# Patient Record
Sex: FEMALE | Race: WHITE | NOT HISPANIC OR LATINO | URBAN - METROPOLITAN AREA
[De-identification: names, ages, dates, MRNs, and addresses within clinical notes are randomized per-mention and may not be internally consistent; named-entity substitution may affect disease eponyms.]

---

## 2017-03-02 ENCOUNTER — OFFICE VISIT (OUTPATIENT)
Dept: INTERNAL MEDICINE | Facility: CLINIC | Age: 27
End: 2017-03-02

## 2017-03-02 VITALS
SYSTOLIC BLOOD PRESSURE: 124 MMHG | HEART RATE: 76 BPM | DIASTOLIC BLOOD PRESSURE: 82 MMHG | BODY MASS INDEX: 19.92 KG/M2 | WEIGHT: 129.1 LBS

## 2017-03-02 DIAGNOSIS — B07.0 PLANTAR WART: Primary | ICD-10-CM

## 2017-03-02 DIAGNOSIS — B07.8 COMMON WART: ICD-10-CM

## 2017-03-02 ASSESSMENT — PAIN SCALES - GENERAL: PAINLEVEL: SEVERE PAIN (6)

## 2017-03-02 NOTE — PROGRESS NOTES
HPI:  Opal Toth is a 26 year old female who presents to clinic today for LN2 treatment of a wart on her right first toe plantar aspect, and her left lateral thumb.  She has recurrent warts which she usually treats with OTC treatments but these did not respond to Duct tape, or Compound W.  The toe wart has been present for 4 months and is getting bigger.  The thumb wart was noted yesterday    She has the following medical issues :   Patient Active Problem List   Diagnosis     Deviated nasal septum     Tonsillar hypertrophy       Patient Active Problem List   Diagnosis     Deviated nasal septum     Tonsillar hypertrophy       MEDICATIONS:  Current Outpatient Prescriptions   Medication Sig Dispense Refill     Calcium Carbonate-Vit D-Min (CALCIUM 1200 PO)        VITAMIN D, CHOLECALCIFEROL, PO Take 2,000 Units by mouth daily       levonorgestrel-ethinyl estradiol (ENPRESSE) per tablet Take 1 tablet by mouth daily 84 tablet 3     [DISCONTINUED] Levonorg-Eth Estrad Triphasic (ENPRESSE-28 PO)          ALLERGIES: Review of patient's allergies indicates no known allergies.    PAST MEDICAL HX: No past medical history on file.    PAST SURGICAL HX: No past surgical history on file.    FAMILY MEDICAL HX: No family history on file.    IMMUNIZATION HX:   Immunization History   Administered Date(s) Administered     DTAP (<7y) 1992, 1992, 1992, 07/19/1993, 02/19/1997     HIB 1992, 1992, 1992, 07/19/1993     Hepatitis A Vac Ped/Adol-2 Dose 08/21/2009, 02/22/2010     Human Papilloma Virus 08/21/2009, 10/21/2009, 02/22/2010     Influenza Vaccine IM 3yrs+ 4 Valent IIV4 09/09/2015     MMR 07/19/1993, 12/04/2003     Meningococcal (Menomune ) 08/21/2009     OPV 1992, 1992, 07/19/1993, 02/19/1997     TD (ADULT, 7+) 12/04/2003     Tdap (Adacel,Boostrix) 06/18/2014       SOCIAL HX:   Social History     Social History     Marital status: Single     Spouse name: N/A     Number of children: N/A      Years of education: N/A     Social History Main Topics     Smoking status: Never Smoker     Smokeless tobacco: Never Used     Alcohol use None     Drug use: None     Sexual activity: Not Asked     Other Topics Concern     None     Social History Narrative    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  She is single but in a monogamous relationship.        OBJECTIVE:  /82 (BP Location: Right arm, Patient Position: Chair, Cuff Size: Adult Regular)  Pulse 76  Wt 58.6 kg (129 lb 1.6 oz)  BMI 19.92 kg/m2   Wt Readings from Last 1 Encounters:   03/02/17 58.6 kg (129 lb 1.6 oz)     Constitutional: no distress, comfortable, pleasant   Right plantar toe wart measures 4 mm x 4 mm in the crease of the MTP joint.   The left hand wart measures 2 x 2 mm.  Both were treated with LN2 (the toe x 4 for 3-5 seconds) ( finger x 3 applications).  She tolerated the procedure fine.      ASSESSMENT/PLAN:    ICD-10-CM    1. Plantar wart B07.0 DESTRUCT BENIGN LESION, UP TO 14   2. Common wart B07.8 DESTRUCT BENIGN LESION, UP TO 14         Total time spent  20.  More than 50% of the time spent with Ms. Toth on counseling / coordinating her care    Nely WILKERSON CNP

## 2017-03-02 NOTE — PATIENT INSTRUCTIONS
Primary Care Center Medication Refill Request Information:  * Please contact your pharmacy regarding ANY request for medication refills.  ** [facility] Prescription Fax = [phone]  * Please allow 3 business days for routine medication refills.  * Please allow 5 business days for controlled substance medication refills.     Primary Care Center Test Result notification information:  *You will be notified with in 7-10 days of your appointment day regarding the results of your test.  If you are on MyChart you will be notified as soon as the provider has reviewed the results and signed off on them.

## 2017-03-02 NOTE — MR AVS SNAPSHOT
After Visit Summary   3/2/2017    Opal Toth    MRN: [id number]           Patient Information     Date Of Birth          1990        Visit Information        Provider Department      3/2/2017 5:00 PM Nely Lloyd, [facility] On license of [facility]        Today's Diagnoses     Plantar wart    -  1    Common wart          Care Instructions    Primary Care Center Medication Refill Request Information:  * Please contact your pharmacy regarding ANY request for medication refills.  ** [facility] Prescription Fax = [phone]  * Please allow 3 business days for routine medication refills.  * Please allow 5 business days for controlled substance medication refills.     Primary Care Center Test Result notification information:  *You will be notified with in 7-10 days of your appointment day regarding the results of your test.  If you are on MyChart you will be notified as soon as the provider has reviewed the results and signed off on them.          Follow-ups after your visit        Your next 10 appointments already scheduled     Mar 20, 2017  3:30 PM CDT   (Arrive by 3:15 PM)   New Patient Visit with SANGEETA Joaquin [facility] ([facility])    [address] [place] [address] [phone]              Who to contact     Please call your clinic at [phone] to:    Ask questions about your health    Make or cancel appointments    Discuss your medicines    Learn about your test results    Speak to your doctor   If you have compliments or concerns about an experience at your clinic, or if you wish to file a complaint, please contact [facility] Patient Relations at [phone] or email us at [email or web address]         Additional Information About Your Visit        MyChart Information     [employer] gives you secure access to your electronic health record. If you see a primary care  provider, you can also send messages to your care team and make appointments. If you have questions, please call your primary care clinic.  If you do not have a primary care provider, please call [phone] and they will assist you.      [employer] Office is an electronic gateway that provides easy, online access to your medical records. With [employer], you can request a clinic appointment, read your test results, renew a prescription or communicate with your care team.     To access your existing account, please contact your [facility] or call [phone] for assistance.        Care EveryWhere ID     This is your Care EveryWhere ID. This could be used by other organizations to access your [facility] medical records  [id number]        Your Vitals Were     Pulse BMI (Body Mass Index)                76 19.92 kg/m2           Blood Pressure from Last 3 Encounters:   03/02/17 124/82   06/28/16 115/74    Weight from Last 3 Encounters:   03/02/17 58.6 kg (129 lb 1.6 oz)   10/24/16 58.1 kg (128 lb)   06/28/16 58.8 kg (129 lb 11.2 oz)              We Performed the Following     DESTRUCT BENIGN LESION, UP TO 14          Today's Medication Changes          These changes are accurate as of: 3/2/17  8:12 PM.  If you have any questions, ask your nurse or doctor.               These medicines have changed or have updated prescriptions.        Dose/Directions    levonorgestrel-ethinyl estradiol per tablet   Commonly known as:  ENPRESSE   This may have changed:  Another medication with the same name was removed. Continue taking this medication, and follow the directions you see here.   Used for:  Encounter for initial prescription of contraceptive pills   Changed by:  Nely Lloyd APRN CNP        Dose:  1 tablet   Take 1 tablet by mouth daily   Quantity:  84 tablet   Refills:  3                Primary Care Provider Office Phone # Fax #    [facility]OCK Cazares [phone]        PRIMARY CARE CENTER [address] [place] [address] 743  [facility] [address]        Thank you!     Thank you for choosing [facility]  for your care. Our goal is always to provide you with excellent care. Hearing back from our patients is one way we can continue to improve our services. Please take a few minutes to complete the written survey that you may receive in the mail after your visit with us. Thank you!             Your Updated Medication List - Protect others around you: Learn how to safely use, store and throw away your medicines at www.disposemymeds.org.          This list is accurate as of: 3/2/17  8:12 PM.  Always use your most recent med list.                   Brand Name Dispense Instructions for use    CALCIUM 1200 PO          levonorgestrel-ethinyl estradiol per tablet    ENPRESSE    84 tablet    Take 1 tablet by mouth daily       VITAMIN D (CHOLECALCIFEROL) PO      Take 2,000 Units by mouth daily

## 2017-03-02 NOTE — NURSING NOTE
Chief Complaint   Patient presents with     Wart     patient states she is here for a wart removal     EMIL CURIEL at 5:04 PM on 3/2/2017.

## 2017-03-20 ENCOUNTER — OFFICE VISIT (OUTPATIENT)
Dept: DERMATOLOGY | Facility: CLINIC | Age: 27
End: 2017-03-20

## 2017-03-20 DIAGNOSIS — Z12.83 SKIN CANCER SCREENING: Primary | ICD-10-CM

## 2017-03-20 DIAGNOSIS — D22.9 MULTIPLE PIGMENTED NEVI: ICD-10-CM

## 2017-03-20 DIAGNOSIS — L81.4 SOLAR LENTIGINOSIS: ICD-10-CM

## 2017-03-20 DIAGNOSIS — D48.9 NEOPLASM OF UNCERTAIN BEHAVIOR: ICD-10-CM

## 2017-03-20 RX ORDER — LIDOCAINE HYDROCHLORIDE AND EPINEPHRINE 10; 10 MG/ML; UG/ML
3 INJECTION, SOLUTION INFILTRATION; PERINEURAL ONCE
Qty: 3 ML | Refills: 0 | OUTPATIENT
Start: 2017-03-20 | End: 2017-03-20

## 2017-03-20 ASSESSMENT — PAIN SCALES - GENERAL: PAINLEVEL: NO PAIN (0)

## 2017-03-20 NOTE — MR AVS SNAPSHOT
After Visit Summary   3/20/2017    Opal Toth    MRN: [id number]           Patient Information     Date Of Birth          1990        Visit Information        Provider Department      3/20/2017 3:30 PM Shalini Navarro PA-C [facility]        Today's Diagnoses     Skin cancer screening    -  1    Solar lentiginosis        Multiple pigmented nevi        Neoplasm of uncertain behavior          Care Instructions    Wound Care After a Biopsy    What is a skin biopsy?  A skin biopsy allows the doctor to examine a very small piece of tissue under the microscope to determine the diagnosis and the best treatment for the skin condition. A local anesthetic (numbing medicine)  is injected with a very small needle into the skin area to be tested. A small piece of skin is taken from the area. Sometimes a suture (stitch) is used.     What are the risks of a skin biopsy?  I will experience scar, bleeding, swelling, pain, crusting and redness. I may experience incomplete removal or recurrence. Risks of this procedure are excessive bleeding, bruising, infection, nerve damage, numbness, thick (hypertrophic or keloidal) scar and non-diagnostic biopsy.    How should I care for my wound for the first 24 hours?    Keep the wound dry and covered for 24 hours    If it bleeds, hold direct pressure on the area for 15 minutes. If bleeding does not stop then go to the emergency room    Avoid strenuous exercise the first 1-2 days or as your doctor instructs you    How should I care for the wound after 24 hours?    After 24 hours, remove the bandage    You may bathe or shower as normal    If you had a scalp biopsy, you can shampoo as usual and can use shower water to clean the biopsy site daily    Clean the wound twice a day with gentle soap and water    Do not scrub, be gentle    Apply white petroleum/Vaseline after cleaning the wound with a cotton swab or a clean finger, and keep the site covered with a  Bandaid /bandage. Bandages are not necessary with a scalp biopsy    If you are unable to cover the site with a Bandaid /bandage, re-apply ointment 2-3 times a day to keep the site moist. Moisture will help with healing    Avoid strenuous activity for first 1-2 days    Avoid lakes, rivers, pools, and oceans until the stitches are removed or the site is healed    How do I clean my wound?    Wash hands for 15 minutes with soap or use hand  before all wound care    Clean the wound with gentle soap and water    Apply white petroleum/Vaseline  to wound after it is clean    Replace the Bandaid /bandage to keep the wound covered for the first few days or as instructed by your doctor    If you had a scalp biopsy, warm shower water to the area on a daily basis should suffice    What should I use to clean my wound?     Cotton-tipped applicators (Qtips )    White petroleum jelly (Vaseline ). Use a clean new container and use Q-tips to apply.    Bandaids   as needed    Gentle soap     How should I care for my wound long term?    Do not get your wound dirty    Keep up with wound care for one week or until the area is healed.    A small scab will form and fall off by itself when the area is completely healed. The area will be red and will become pink in color as it heals. Sun protection is very important for how your scar will turn out. Sunscreen with an SPF 30 or greater is recommended once the area is healed.    If you have stitches, stitches need to be removed in  days. You may return to our clinic for this or you may have it done locally at your doctor s office.    You should have some soreness but it should be mild and slowly go away over several days. Talk to your doctor about using tylenol for pain,    When should I call my doctor?  If you have increased:     Pain or swelling    Pus or drainage (clear or slightly yellow drainage is ok)    Temperature over 100F    Spreading redness or warmth around wound    When  will I hear about my results?  The biopsy results can take 2-3 weeks to come back. The clinic will call you with the results, send you a [employer] message, or have you schedule a follow-up clinic or phone time to discuss the results. Contact our clinics if you do not hear from us in 3 weeks.     Who should I call with questions?    [facility]: [phone]     [facility]: [phone]    For urgent needs outside of business hours call the [facility] at [phone] and ask for the dermatology resident on call            Follow-ups after your visit        Who to contact     Please call your clinic at [phone] to:    Ask questions about your health    Make or cancel appointments    Discuss your medicines    Learn about your test results    Speak to your doctor   If you have compliments or concerns about an experience at your clinic, or if you wish to file a complaint, please contact [facility] Patient Relations at [phone] or email us at [email or web address]         Additional Information About Your Visit        ePub Directhart Information     [employer] gives you secure access to your electronic health record. If you see a primary care provider, you can also send messages to your care team and make appointments. If you have questions, please call your primary care clinic.  If you do not have a primary care provider, please call [phone] and they will assist you.      [employer] is an electronic gateway that provides easy, online access to your medical records. With [employer], you can request a clinic appointment, read your test results, renew a prescription or communicate with your care team.     To access your existing account, please contact your [facility] or call [phone] for assistance.        Care EveryWhere ID     This is your Care EveryWhere ID. This could be used by other  organizations to access your [facility] medical records  [id number]         Blood Pressure from Last 3 Encounters:   03/02/17 124/82   06/28/16 115/74    Weight from Last 3 Encounters:   03/02/17 58.6 kg (129 lb 1.6 oz)   10/24/16 58.1 kg (128 lb)   06/28/16 58.8 kg (129 lb 11.2 oz)              We Performed the Following     BIOPSY SKIN/SUBQ/MUC MEM, EACH ADDTL LESION     BIOPSY SKIN/SUBQ/MUC MEM, SINGLE LESION     Surgical pathology exam          Today's Medication Changes          These changes are accurate as of: 3/20/17  4:16 PM.  If you have any questions, ask your nurse or doctor.               Start taking these medicines.        Dose/Directions    lidocaine 1% with EPINEPHrine 1:100,000 1 %-1:173021 injection   Used for:  Neoplasm of uncertain behavior   Started by:  Shalini Navarro PA-C        Dose:  3 mL   Inject 3 mLs into the skin once for 1 dose   Quantity:  3 mL   Refills:  0            Where to get your medicines      Some of these will need a paper prescription and others can be bought over the counter.  Ask your nurse if you have questions.     You don't need a prescription for these medications     lidocaine 1% with EPINEPHrine 1:100,000 1 %-1:180017 injection                Primary Care Provider Office Phone # Fax #    Nely ROCK Small [phone]       PRIMARY CARE CENTER [address] [place] [address] 741  [facility] [address]        Thank you!     Thank you for choosing [facility]  for your care. Our goal is always to provide you with excellent care. Hearing back from our patients is one way we can continue to improve our services. Please take a few minutes to complete the written survey that you may receive in the mail after your visit with us. Thank you!             Your Updated Medication List - Protect others around you: Learn how to safely use, store and throw away your medicines at www.disposemymeds.org.          This list is accurate as of: 3/20/17  4:16  PM.  Always use your most recent med list.                   Brand Name Dispense Instructions for use    CALCIUM 1200 PO          levonorgestrel-ethinyl estradiol per tablet    ENPRESSE    84 tablet    Take 1 tablet by mouth daily       lidocaine 1% with EPINEPHrine 1:100,000 1 %-1:351466 injection     3 mL    Inject 3 mLs into the skin once for 1 dose       VITAMIN D (CHOLECALCIFEROL) PO      Take 2,000 Units by mouth daily

## 2017-03-20 NOTE — NURSING NOTE
Dermatology Rooming Note    Opal Toth's goals for this visit include:   Chief Complaint   Patient presents with     Skin Check     Skin check, Opal notes several areas of concern.     Jahaira Lord LPN

## 2017-03-20 NOTE — LETTER
3/20/2017       RE: Opal Toth  [address]  [place] [address]     Dear Colleague,    Thank you for referring your patient, Opal Toth, to the [facility] DERMATOLOGY at [facility]. Please see a copy of my visit note below.    [facility] Dermatology Note      Dermatology Problem List:  1. Skin cancer screening 3/20 with multiple benign nevi, solar lentigines.  Hx of tanning bed use and outdoor tanning.  3/20 Shave Bx to her Left anterior hip, Left central back medial, Left central back lateral, Right central back       CC:   Chief Complaint   Patient presents with     Skin Check     Skin check, Opal notes several areas of concern.         Encounter Date: Mar 20, 2017    History of Present Illness:  Ms. Opal Toth is a 26 year old female who presents as a referral from MIGUEL A Lloyd. She is here for a skin cancer screening. She has had a lot of sun exposure over her lifetime and some sunburns. She noticed a mole on her left hip has gotten larger. She also wonders about moles on her back.     She has a history of a mole removal on her left thigh when she was approximately 12 yrs old. She states this was benign.     She is feeling well otherwise, without other skin concerns.She denies any spots that are painful, bleeding or itchy.        Past Medical History:   Patient Active Problem List   Diagnosis     Deviated nasal septum     Tonsillar hypertrophy     No past medical history on file.  No past surgical history on file.    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 The patient admits to use of tanning beds, prior to events, last time 3 yrs ago.     Family History:  There is no family history of skin cancer.    Medications:  Current Outpatient Prescriptions   Medication Sig Dispense Refill     Calcium Carbonate-Vit D-Min (CALCIUM 1200 PO)        VITAMIN D, CHOLECALCIFEROL, PO Take 2,000 Units by mouth daily       levonorgestrel-ethinyl  estradiol (ENPRESSE) per tablet Take 1 tablet by mouth daily 84 tablet 3     No Known Allergies      Review of Systems:  -Skin/Heme New Pt: The patient admits to frequent sun exposure. Outdoors a lot. Sunburns in youth.  The patient denies excessive scarring or problems healing except as per HPI. The patient denies excessive bleeding.  -Constitutional: The patient denies fatigue, fevers, chills, unintended weight loss, and night sweats.  -HEENT: Patient denies nonhealing oral sores.  -Skin: As above in HPI. No additional skin concerns.    Physical exam:  Vitals: There were no vitals taken for this visit.  GEN: This is a well developed, well-nourished female in no acute distress, in a pleasant mood.    SKIN: Full skin, which includes the head/face, both arms, chest, back, abdomen,both legs, genitalia and/or groin buttocks, digits and/or nails, was examined.  On her left anterior hip there is a 5 mm brown variegated macule.   On the left central back medial there is a 4 mm brown variegated macule with irregular borders.   On the left central back lateral there is a 4 mm brown variegated macule with irregular borders.   On the right central back there is a 4 mm brown variegated macule with irregular borders.   -Multiple regular brown pigmented macules and papules are identified on the face, trunk and extremities, including a 4 cm x 4 cm uniform macule on the right plantar foot .   -Scattered brown macules on sun exposed areas.  -No other lesions of concern on areas examined.     Impression/Plan:  1. 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 The differential diagnosis includes dysplastic nevi vs benign nevi vs other     Shave biopsy:  After discussion of benefits and risks including but not limited to bleeding/bruising, pain/swelling, infection, scar, incomplete removal, nerve damage/numbness, recurrence, and non-diagnostic  biopsy, written consent, verbal consent and photographs were obtained. Time-out was performed. The area was cleaned with isopropyl alcohol. 3 mL of 1% lidocaine with epinephrine was injected to obtain adequate anesthesia of the lesion on the  left anterior hip, left central back medial and left central back lateral and right central back. A shave biopsy was performed. Hemostasis was achieved with aluminium chloride. Vaseline and a sterile dressing were applied. The patient tolerated the procedure and no complications were noted. The patient was provided with verbal and written post care instructions.       2. Multiple clinically benign nevi on the face, trunk and extremities    ABCDs of melanoma were discussed and self skin checks were advised.     Recommend recheck in 6 months as there were many concerning nevi today.     3. Solar lentigines    Sun precaution was advised including the use of sun screens of SPF 30 or higher, sun protective clothing, and avoidance of tanning beds.    Staff Involved:  Staff Only    All risks, benefits and alternatives were discussed with patient.  Patient is in agreement and understands the assessment and plan.  All questions were answered.  Sun Screen Education was given.   Return to Clinic in 6 months or sooner as needed.   Shalini Navarro PA-C

## 2017-03-20 NOTE — PROGRESS NOTES
[facility] Dermatology Note      Dermatology Problem List:  1. Skin cancer screening 3/20 with multiple benign nevi, solar lentigines.  Hx of tanning bed use and outdoor tanning.  3/20 Shave Bx to her Left anterior hip, Left central back medial, Left central back lateral, Right central back       CC:   Chief Complaint   Patient presents with     Skin Check     Skin check, Opal notes several areas of concern.         Encounter Date: Mar 20, 2017    History of Present Illness:  Ms. Opal Toth is a 26 year old female who presents as a referral from MIGUEL A Lloyd. She is here for a skin cancer screening. She has had a lot of sun exposure over her lifetime and some sunburns. She noticed a mole on her left hip has gotten larger. She also wonders about moles on her back.     She has a history of a mole removal on her left thigh when she was approximately 12 yrs old. She states this was benign.     She is feeling well otherwise, without other skin concerns.She denies any spots that are painful, bleeding or itchy.        Past Medical History:   Patient Active Problem List   Diagnosis     Deviated nasal septum     Tonsillar hypertrophy     No past medical history on file.  No past surgical history on file.    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 The patient admits to use of tanning beds, prior to events, last time 3 yrs ago.     Family History:  There is no family history of skin cancer.    Medications:  Current Outpatient Prescriptions   Medication Sig Dispense Refill     Calcium Carbonate-Vit D-Min (CALCIUM 1200 PO)        VITAMIN D, CHOLECALCIFEROL, PO Take 2,000 Units by mouth daily       levonorgestrel-ethinyl estradiol (ENPRESSE) per tablet Take 1 tablet by mouth daily 84 tablet 3     No Known Allergies      Review of Systems:  -Skin/Heme New Pt: The patient admits to frequent sun exposure. Outdoors a lot. Sunburns in youth.  The patient denies excessive scarring or problems  healing except as per HPI. The patient denies excessive bleeding.  -Constitutional: The patient denies fatigue, fevers, chills, unintended weight loss, and night sweats.  -HEENT: Patient denies nonhealing oral sores.  -Skin: As above in HPI. No additional skin concerns.    Physical exam:  Vitals: There were no vitals taken for this visit.  GEN: This is a well developed, well-nourished female in no acute distress, in a pleasant mood.    SKIN: Full skin, which includes the head/face, both arms, chest, back, abdomen,both legs, genitalia and/or groin buttocks, digits and/or nails, was examined.  On her left anterior hip there is a 5 mm brown variegated macule.   On the left central back medial there is a 4 mm brown variegated macule with irregular borders.   On the left central back lateral there is a 4 mm brown variegated macule with irregular borders.   On the right central back there is a 4 mm brown variegated macule with irregular borders.   -Multiple regular brown pigmented macules and papules are identified on the face, trunk and extremities, including a 4 cm x 4 cm uniform macule on the right plantar foot .   -Scattered brown macules on sun exposed areas.  -No other lesions of concern on areas examined.     Impression/Plan:  1. 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 The differential diagnosis includes dysplastic nevi vs benign nevi vs other     Shave biopsy:  After discussion of benefits and risks including but not limited to bleeding/bruising, pain/swelling, infection, scar, incomplete removal, nerve damage/numbness, recurrence, and non-diagnostic biopsy, written consent, verbal consent and photographs were obtained. Time-out was performed. The area was cleaned with isopropyl alcohol. 3 mL of 1% lidocaine with epinephrine was injected to obtain adequate anesthesia of the lesion on the  left anterior hip, left central  back medial and left central back lateral and right central back. A shave biopsy was performed. Hemostasis was achieved with aluminium chloride. Vaseline and a sterile dressing were applied. The patient tolerated the procedure and no complications were noted. The patient was provided with verbal and written post care instructions.       2. Multiple clinically benign nevi on the face, trunk and extremities    ABCDs of melanoma were discussed and self skin checks were advised.     Recommend recheck in 6 months as there were many concerning nevi today.     3. Solar lentigines    Sun precaution was advised including the use of sun screens of SPF 30 or higher, sun protective clothing, and avoidance of tanning beds.          Staff Involved:  Staff Only    All risks, benefits and alternatives were discussed with patient.  Patient is in agreement and understands the assessment and plan.  All questions were answered.  Sun Screen Education was given.   Return to Clinic in 6 months or sooner as needed.   Shalini Navarro PA-C

## 2017-03-20 NOTE — PATIENT INSTRUCTIONS
Wound Care After a Biopsy    What is a skin biopsy?  A skin biopsy allows the doctor to examine a very small piece of tissue under the microscope to determine the diagnosis and the best treatment for the skin condition. A local anesthetic (numbing medicine)  is injected with a very small needle into the skin area to be tested. A small piece of skin is taken from the area. Sometimes a suture (stitch) is used.     What are the risks of a skin biopsy?  I will experience scar, bleeding, swelling, pain, crusting and redness. I may experience incomplete removal or recurrence. Risks of this procedure are excessive bleeding, bruising, infection, nerve damage, numbness, thick (hypertrophic or keloidal) scar and non-diagnostic biopsy.    How should I care for my wound for the first 24 hours?    Keep the wound dry and covered for 24 hours    If it bleeds, hold direct pressure on the area for 15 minutes. If bleeding does not stop then go to the emergency room    Avoid strenuous exercise the first 1-2 days or as your doctor instructs you    How should I care for the wound after 24 hours?    After 24 hours, remove the bandage    You may bathe or shower as normal    If you had a scalp biopsy, you can shampoo as usual and can use shower water to clean the biopsy site daily    Clean the wound twice a day with gentle soap and water    Do not scrub, be gentle    Apply white petroleum/Vaseline after cleaning the wound with a cotton swab or a clean finger, and keep the site covered with a Bandaid /bandage. Bandages are not necessary with a scalp biopsy    If you are unable to cover the site with a Bandaid /bandage, re-apply ointment 2-3 times a day to keep the site moist. Moisture will help with healing    Avoid strenuous activity for first 1-2 days    Avoid lakes, rivers, pools, and oceans until the stitches are removed or the site is healed    How do I clean my wound?    Wash hands for 15 minutes with soap or use hand  before  all wound care    Clean the wound with gentle soap and water    Apply white petroleum/Vaseline  to wound after it is clean    Replace the Bandaid /bandage to keep the wound covered for the first few days or as instructed by your doctor    If you had a scalp biopsy, warm shower water to the area on a daily basis should suffice    What should I use to clean my wound?     Cotton-tipped applicators (Qtips )    White petroleum jelly (Vaseline ). Use a clean new container and use Q-tips to apply.    Bandaids   as needed    Gentle soap     How should I care for my wound long term?    Do not get your wound dirty    Keep up with wound care for one week or until the area is healed.    A small scab will form and fall off by itself when the area is completely healed. The area will be red and will become pink in color as it heals. Sun protection is very important for how your scar will turn out. Sunscreen with an SPF 30 or greater is recommended once the area is healed.    If you have stitches, stitches need to be removed in  days. You may return to our clinic for this or you may have it done locally at your doctor s office.    You should have some soreness but it should be mild and slowly go away over several days. Talk to your doctor about using tylenol for pain,    When should I call my doctor?  If you have increased:     Pain or swelling    Pus or drainage (clear or slightly yellow drainage is ok)    Temperature over 100F    Spreading redness or warmth around wound    When will I hear about my results?  The biopsy results can take 2-3 weeks to come back. The clinic will call you with the results, send you a [facility] message, or have you schedule a follow-up clinic or phone time to discuss the results. Contact our clinics if you do not hear from us in 3 weeks.     Who should I call with questions?    [facility]: [phone]     [facility]: [phone]    For  urgent needs outside of business hours call the [facility] at [phone] and ask for the dermatology resident on call

## 2017-03-22 LAB — COPATH REPORT: NORMAL

## 2017-03-24 ENCOUNTER — TELEPHONE (OUTPATIENT)
Dept: DERMATOLOGY | Facility: CLINIC | Age: 27
End: 2017-03-24

## 2017-03-24 NOTE — TELEPHONE ENCOUNTER
"LVM for Opal to call back and discuss results. See below for notes recorded by provider.      Opal, all of the spots biopsied were \"dysplastic\". There are grades of dysplasia (or abnormal cells) from mild to severe. The hip and the left central back medial were mild and the left central back lateral and right central back were moderate. We will monitor all of these sites at your six month follow up and recommend further removal if there is any pigment returning to the moderate sites. At this point it looks the like the moles were all removed during the biopsy process but always has the chance to return.  "

## 2017-07-06 ENCOUNTER — OFFICE VISIT (OUTPATIENT)
Dept: INTERNAL MEDICINE | Facility: CLINIC | Age: 27
End: 2017-07-06

## 2017-07-06 VITALS
BODY MASS INDEX: 19.13 KG/M2 | DIASTOLIC BLOOD PRESSURE: 72 MMHG | WEIGHT: 124 LBS | SYSTOLIC BLOOD PRESSURE: 107 MMHG | HEART RATE: 77 BPM

## 2017-07-06 DIAGNOSIS — B07.8 COMMON WART: Primary | ICD-10-CM

## 2017-07-06 DIAGNOSIS — H92.01 EAR PAIN, RIGHT: ICD-10-CM

## 2017-07-06 NOTE — NURSING NOTE
Chief Complaint   Patient presents with     Ear Problem     Patient here for possible ear infection     Wart     Patient here for wart removal.       Maye Vargas CMA at 4:19 PM on 7/6/2017.

## 2017-07-06 NOTE — PATIENT INSTRUCTIONS
[facility] Medication Refill Request Information:  * Please contact your pharmacy regarding ANY request for medication refills.  ** [facility] Prescription Fax = [phone]  * Please allow 3 business days for routine medication refills.  * Please allow 5 business days for controlled substance medication refills.     [facility] Test Result notification information:  *You will be notified with in 7-10 days of your appointment day regarding the results of your test.  If you are on MyChart you will be notified as soon as the provider has reviewed the results and signed off on them.    [facility] [phone]

## 2017-07-06 NOTE — MR AVS SNAPSHOT
After Visit Summary   7/6/2017    Opal Toth    MRN: [id number]           Patient Information     Date Of Birth          1990        Visit Information        Provider Department      7/6/2017 4:20 PM Nely Lloyd, APRN CNP [facility]        Today's Diagnoses     Common wart    -  1    Ear pain, right          Care Instructions    Primary Care Center Medication Refill Request Information:  * Please contact your pharmacy regarding ANY request for medication refills.  ** [facility] Prescription Fax = [phone]  * Please allow 3 business days for routine medication refills.  * Please allow 5 business days for controlled substance medication refills.     Primary [facility] Center Test Result notification information:  *You will be notified with in 7-10 days of your appointment day regarding the results of your test.  If you are on MyChart you will be notified as soon as the provider has reviewed the results and signed off on them.    [facility] [phone]             Follow-ups after your visit        Your next 10 appointments already scheduled     Jul 27, 2017  4:00 PM CDT   (Arrive by 3:45 PM)   New Patient Visit with Boo Phelps MD   [facility] Ear Nose and Throat ([facility])    [address] [place] [address]  [address] [facility] [address] [phone]            Sep 21, 2017  3:15 PM CDT   (Arrive by 3:00 PM)   Return Visit with Shalini Navarro PA-C   [facility] Dermatology ([facility])    [address] [place] [address]  3rd [facility] [address] [phone]              Who to contact     Please call your clinic at [phone] to:    Ask questions about your health    Make or cancel appointments    Discuss your medicines    Learn about your test results    Speak to your doctor   If you have compliments or concerns about an experience at your clinic, or if you wish to file a complaint, please contact  [facility] Patient Relations at [phone] or email us at [email or web address]         Additional Information About Your Visit        [email or web address] Information     [email or web address] gives you secure access to your electronic health record. If you see a primary care provider, you can also send messages to your care team and make appointments. If you have questions, please call your primary care clinic.  If you do not have a primary care provider, please call [phone] and they will assist you.      [employer]ia is an electronic gateway that provides easy, online access to your medical records. With [employer], you can request a clinic appointment, read your test results, renew a prescription or communicate with your care team.     To access your existing account, please contact your [facility] or call [phone] for assistance.        Care EveryWhere ID     This is your Care EveryWhere ID. This could be used by other organizations to access your [place] medical records  [id number]        Your Vitals Were     Pulse Breastfeeding? BMI (Body Mass Index)             77 No 19.13 kg/m2          Blood Pressure from Last 3 Encounters:   07/06/17 107/72   03/02/17 124/82   06/28/16 115/74    Weight from Last 3 Encounters:   07/06/17 56.2 kg (124 lb)   03/02/17 58.6 kg (129 lb 1.6 oz)   10/24/16 58.1 kg (128 lb)              We Performed the Following     DESTRUCT BENIGN LESION, UP TO 14        Primary Care Provider Office Phone # Fax #    Nely TAMARA Lloyd, APRN [phone]       PRIMARY [facility]ARE CENTER [address] [place] [id number]        Equal Access to Services     AMI JACK : Hadii jamie huertas Socady, waaxda luqadaha, qajoleneta kaalmada coco gunter. So [facility] [phone].    ATENCIÓN: Si habla español, tiene a stacy disposición servicios gratuitos de asistencia lingüística. Llame al  [phone].    We comply with applicable federal civil rights laws and [place] laws. We do not discriminate on the basis of race, color, national origin, age, disability sex, sexual orientation or gender identity.            Thank you!     Thank you for choosing [facility]  for your care. Our goal is always to provide you with excellent care. Hearing back from our patients is one way we can continue to improve our services. Please take a few minutes to complete the written survey that you may receive in the mail after your visit with us. Thank you!             Your Updated Medication List - Protect others around you: Learn how to safely use, store and throw away your medicines at www.disposemymeds.org.          This list is accurate as of: 7/6/17 11:59 PM.  Always use your most recent med list.                   Brand Name Dispense Instructions for use Diagnosis    CALCIUM 1200 PO           levonorgestrel-ethinyl estradiol per tablet    ENPRESSE    84 tablet    Take 1 tablet by mouth daily    Encounter for initial prescription of contraceptive pills       VITAMIN D (CHOLECALCIFEROL) PO      Take 2,000 Units by mouth daily

## 2017-07-10 NOTE — PROGRESS NOTES
HPI: Opal Toht is a 27 year old female who comes in for re-treatment of a small wart on her right first toe and right thumb.   She had some discomfort in her right ear a few weeks ago and she used H202 an water rinses but continues to feel a fullness in the ear, although no true pain.     She also has made an appointment with ENT to discuss her recurrent tonsillar pain and discomfort with swallowing. She is not having any tonsillar issues today.     Patient Active Problem List   Diagnosis     Deviated nasal septum     Tonsillar hypertrophy       She has a medical hx of  does not have any pertinent problems on file.    Current Outpatient Prescriptions   Medication Sig Dispense Refill     Calcium Carbonate-Vit D-Min (CALCIUM 1200 PO)        VITAMIN D, CHOLECALCIFEROL, PO Take 2,000 Units by mouth daily       levonorgestrel-ethinyl estradiol (ENPRESSE) per tablet Take 1 tablet by mouth daily 84 tablet 3         ALLERGIES: Review of patient's allergies indicates no known allergies.    PAST MEDICAL HX: No past medical history on file.    PAST SURGICAL HX: No past surgical history on file.    Family History   Problem Relation Age of Onset     Melanoma No family hx of      Skin Cancer No family hx of          IMMUNIZATION HX:   Immunization History   Administered Date(s) Administered     DTAP (<7y) 1992, 1992, 1992, 07/19/1993, 02/19/1997     HIB 1990, 1992, 1992, 07/19/1993     HPVQuadrivalent 08/21/2009, 10/21/2009, 02/22/2010     Hepatitis A Vac Ped/Adol-2 Dose 08/21/2009, 02/22/2010     Influenza Vaccine IM 3yrs+ 4 Valent IIV4 09/09/2015     MMR 07/19/1993, 12/04/2003     Meningococcal (Menomune ) 08/21/2009     OPV 1992, 1992, 07/19/1993, 02/19/1997     TD (ADULT, 7+) 12/04/2003     Tdap (Adacel,Boostrix) 06/18/2014       SOCIAL HX:   Social History     Social History Narrative    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  She is single but in a monogamous  relationship.      ROS: 5 system ROS reviewed w/o changes except for above    OBJECTIVE:  /72  Pulse 77  Wt 56.2 kg (124 lb)  Breastfeeding? No  BMI 19.13 kg/m2   Wt Readings from Last 1 Encounters:   07/06/17 56.2 kg (124 lb)     Constitutional: no distress, comfortable, pleasant   Eyes: anicteric  Ears, Nose and Throat: tympanic membranes clear, right TM convexed somewhat with clear TM.  Throat airway small with prominent tonsillar arches.   Neck: supple with full range of motion, no thyromegaly.     Skin: small 3 mm nude flesh colored papule present in the plantar crease of the right first toe.  This was treated with 3 applications of LN2. She had a 2 mm slight papule on the right thumb distal cantor surface which treated with 3 applications of LN2.     Neurological: normal speech, no tremor   Psychological: appropriate mood       ASSESSMENT/PLAN:  Opal was seen today for ear problem and wart.    Diagnoses and all orders for this visit:    Common wart  Comments:  right thumb and right first toe  Orders:  -     DESTRUCT BENIGN LESION, UP TO 14    Ear pain, right  Mild serous otitis.  Advised to use decongestant as tolerated.  She denies allergic symptoms.  No indication of infection at this time.     Total time spent 10 minutes.The rest of the time was procedure time which was another 12 minutes.     Nely WILKERSON, CNP

## 2017-08-04 ENCOUNTER — PRE VISIT (OUTPATIENT)
Dept: OTOLARYNGOLOGY | Facility: CLINIC | Age: 27
End: 2017-08-04

## 2017-08-04 NOTE — TELEPHONE ENCOUNTER
Records below from [place] are SCANNED IN [facility]  Office notes:  DOS 6/11/14 with Dr. Fede Le (ENT)  Radiology reports:  DOS 7/9/14 Neck CT done (img requested)    Missing:  Barium Swallow ?

## 2017-08-04 NOTE — TELEPHONE ENCOUNTER
1.  Date/reason for appt: 8/18/17 at 3 PM - Trouble Swallowing   2.  Referring provider: Self  3.  Call to patient (Yes / No - short description): phone number in chart doesn't work, email sent.  4.  Previous care at:   [facility] [place]

## 2017-08-16 NOTE — TELEPHONE ENCOUNTER
[place] records received from [place], forwarded to clinic.   Radiology Report:  DOS 7/9/14 Barium Swallow

## 2017-08-18 ENCOUNTER — OFFICE VISIT (OUTPATIENT)
Dept: OTOLARYNGOLOGY | Facility: CLINIC | Age: 27
End: 2017-08-18

## 2017-08-18 VITALS — WEIGHT: 125 LBS | BODY MASS INDEX: 19.29 KG/M2

## 2017-08-18 DIAGNOSIS — R13.10 DYSPHAGIA, UNSPECIFIED TYPE: ICD-10-CM

## 2017-08-18 DIAGNOSIS — J31.0 CHRONIC RHINITIS, UNSPECIFIED TYPE: ICD-10-CM

## 2017-08-18 DIAGNOSIS — J32.0 CHRONIC MAXILLARY SINUSITIS: Primary | ICD-10-CM

## 2017-08-18 ASSESSMENT — PAIN SCALES - GENERAL: PAINLEVEL: NO PAIN (0)

## 2017-08-18 NOTE — LETTER
8/18/2017       RE: Opla Toth  [address]  [place] [address]     Dear Colleague,    Thank you for referring your patient, Opal Toth, to the [facility] EAR [facility]SE [facility] at [facility]. Please see a copy of my visit note below.    The patient presents with a history of chronic rhinitis and thick post-nasal drainage. The patient reports eustachian tube dysfunction associated with these symptoms and at times the feeling that food is sticking in the throat. The patient denies facial pain. The patient denies chronic or recurrent tonsillitis, chronic or recurrent pharyngitis. The patient denies otalgia, otorrhea, dizziness or tinnitus.     This patient is seen in consultation as a self referral to my clinic.     All other systems were reviewed and they are either negative or they are not directly pertinent to this Otolaryngology examination.      Past Medical History:    Past Medical History:   Diagnosis Date     Arthritis        Past Surgical History:    No past surgical history on file.    Medications:      Current Outpatient Prescriptions:      Calcium Carbonate-Vit D-Min (CALCIUM 1200 PO), , Disp: , Rfl:      VITAMIN D, CHOLECALCIFEROL, PO, Take 2,000 Units by mouth daily, Disp: , Rfl:      levonorgestrel-ethinyl estradiol (ENPRESSE) per tablet, Take 1 tablet by mouth daily, Disp: 84 tablet, Rfl: 3    Allergies:    Review of patient's allergies indicates no known allergies.    Physical Examination:    The patient is a well developed, well nourished female in no apparent distress.  She is normocephalic, atraumatic with pupils equally round and reactive to light.    Oral Cavity Examination:  Normal mucosa with no masses or lesions  Nasal Examination:  Engorged nasal turbinates and a slightly deviated septum.  There are no masses or lesions in either nostril and no discharge or infection in either nostril at this time.  Ear Examination: Ear canals clear, tympanic  membranes and middle ear spaces normal  Neurological Examination: Facial nerve function intact and symmetric  Integumentary Examination: No lesions on the skin of the head and neck  Neck Examination: No masses or lesions, no lymphadenopathy  Endocrine Examination: Normal thyroid examination  Flexible Fiberoptic Laryngoscopy:  Fullness of the adenoid tissue bed with purulent drainage in the nasopharynx. Normal base of tongue, pyriform sinuses, epiglottis, valleculae, false vocal cords, true vocal cords, and larynx.  Normal motion of the vocal cords with no lesions, masses, nodules, or polyps bilaterally.    Assessment and Plan:    The patient presents with a history of chronic rhinitis and thick post-nasal drainage that is infected at times. The patient is also having the feeling of food sticking the throat at times.  We will obtain a CT scan of the sinuses in one week after a course of Augmentin.  We will see the patient again in two weeks to assess progress with treatment and to review the results of the CT scan. An allergy evaluation with Dr. Beau Condon will also be ordered.         Again, thank you for allowing me to participate in the care of your patient.      Sincerely,    Boo Phelps MD

## 2017-08-18 NOTE — PROGRESS NOTES
The patient presents with a history of chronic rhinitis and thick post-nasal drainage. The patient reports eustachian tube dysfunction associated with these symptoms and at times the feeling that food is sticking in the throat. The patient denies facial pain. The patient denies chronic or recurrent tonsillitis, chronic or recurrent pharyngitis. The patient denies otalgia, otorrhea, dizziness or tinnitus.     This patient is seen in consultation as a self referral to my clinic.     All other systems were reviewed and they are either negative or they are not directly pertinent to this Otolaryngology examination.      Past Medical History:    Past Medical History:   Diagnosis Date     Arthritis        Past Surgical History:    No past surgical history on file.    Medications:      Current Outpatient Prescriptions:      Calcium Carbonate-Vit D-Min (CALCIUM 1200 PO), , Disp: , Rfl:      VITAMIN D, CHOLECALCIFEROL, PO, Take 2,000 Units by mouth daily, Disp: , Rfl:      levonorgestrel-ethinyl estradiol (ENPRESSE) per tablet, Take 1 tablet by mouth daily, Disp: 84 tablet, Rfl: 3    Allergies:    Review of patient's allergies indicates no known allergies.    Physical Examination:    The patient is a well developed, well nourished female in no apparent distress.  She is normocephalic, atraumatic with pupils equally round and reactive to light.    Oral Cavity Examination:  Normal mucosa with no masses or lesions  Nasal Examination:  Engorged nasal turbinates and a slightly deviated septum.  There are no masses or lesions in either nostril and no discharge or infection in either nostril at this time.  Ear Examination: Ear canals clear, tympanic membranes and middle ear spaces normal  Neurological Examination: Facial nerve function intact and symmetric  Integumentary Examination: No lesions on the skin of the head and neck  Neck Examination: No masses or lesions, no lymphadenopathy  Endocrine Examination: Normal thyroid  examination  Flexible Fiberoptic Laryngoscopy:  Fullness of the adenoid tissue bed with purulent drainage in the nasopharynx. Normal base of tongue, pyriform sinuses, epiglottis, valleculae, false vocal cords, true vocal cords, and larynx.  Normal motion of the vocal cords with no lesions, masses, nodules, or polyps bilaterally.    Assessment and Plan:    The patient presents with a history of chronic rhinitis and thick post-nasal drainage that is infected at times. The patient is also having the feeling of food sticking the throat at times.  We will obtain a CT scan of the sinuses in one week after a course of Augmentin.  We will see the patient again in two weeks to assess progress with treatment and to review the results of the CT scan. An allergy evaluation with Dr. Beau Condon will also be ordered.

## 2017-08-18 NOTE — MR AVS SNAPSHOT
After Visit Summary   8/18/2017    Opal Toth    MRN: [id number]           Patient Information     Date Of Birth          1990        Visit Information        Provider Department      8/18/2017 3:00 PM Boo Phelps MD [facility]        Today's Diagnoses     Chronic maxillary sinusitis    -  1    Chronic rhinitis, unspecified type        Dysphagia, unspecified type          Care Instructions    The patient presents with a history of chronic rhinitis and thick post-nasal drainage that is infected at times. The patient is also having the feeling of food sticking the throat at times.  We will obtain a CT scan of the sinuses in one week after a course of Augmentin.  We will see the patient again in two weeks to assess progress with treatment and to review the results of the CT scan. An allergy evaluation with Dr. Beau Condon will also be ordered.          Follow-ups after your visit        Your next 10 appointments already scheduled     Aug 21, 2017  3:15 PM CDT   New Patient Visit with Adan Quijano CNM   [facility] ([facility])    [facility] [address]  [address]  [address]  [facility] [id number] [phone]            Aug 28, 2017  2:20 PM CDT   CT MAXILLOFACIAL W/O CONTRAST with UCCT1   [facility] CT ([facility])    [address] [facility]  1st Floor  [facility] [id number] [phone]           Please bring any scans or X-rays taken at other hospitals, if similar tests were done. Also bring a list of your medicines, including vitamins, minerals and over-the-counter drugs. It is safest to leave personal items at home.  Be sure to tell your doctor:   If you have any allergies.   If there s any chance you are pregnant.   If you are breastfeeding.   If you have any special needs.  You do not need to do anything special to prepare.  Please wear  loose clothing, such as a sweat suit or jogging clothes. Avoid snaps, zippers and other metal. We may ask you to undress and put on a hospital gown.            Aug 31, 2017  2:45 PM CDT   (Arrive by 2:30 PM)   Return Visit with Boo Phelps MD   [facility] Ear Nose [facility] Throat ([facility])    [address] [place] [address]  [address] [facility] [id number] [phone]            Sep 21, 2017  3:15 PM CDT   (Arrive by 3:00 PM)   Return Visit with Shalini Navarro PA-C   [facility] Dermatology ([facility])    [address] [place] [address]  [address]rd [facility] [id number] [phone]            Nov 13, 2017  2:55 PM CST   (Arrive by 2:40 PM)   New Allergy with Beau Condon MD   [facility] ([facility])    [address] [place] [address]  3rd [facility] [id number] [phone]           Do not take anti-histamines or Zantac for seven day prior to your appointment.              Future tests that were ordered for you today     Open Future Orders        Priority Expected Expires Ordered    CT Maxillofacial w/o contrast Routine  8/18/2018 8/18/2017            Who to contact     Please call your clinic at [phone] to:    Ask questions about your health    Make or cancel appointments    Discuss your medicines    Learn about your test results    Speak to your doctor   If you have compliments or concerns about an experience at your clinic, or if you wish to file a complaint, please contact [facility] Patient Relations at [phone] or email us at [email or web address]         Additional Information About Your Visit        MyChart Information     [employer] gives you secure access to your electronic health record. If you see a primary care provider, you can also send messages to your care team and make appointments. If you have questions, please call your  primary care clinic.  If you do not have a primary care provider, please call [phone] and they will assist you.      "[employer] is an electronic gateway that provides easy, online access to your medical records. With "[employer] you can request a clinic appointment, read your test results, renew a prescription or communicate with your care team.     To access your existing account, please contact your [facility] or call [phone] for assistance.        Care EveryWhere ID     This is your Care EveryWhere ID. This could be used by other organizations to access your [facility] medical records  [id number]        Your Vitals Were     BMI (Body Mass Index)                   19.29 kg/m2            Blood Pressure from Last 3 Encounters:   07/06/17 107/72   03/02/17 124/82   06/28/16 115/74    Weight from Last 3 Encounters:   08/18/17 56.7 kg (125 lb)   07/06/17 56.2 kg (124 lb)   03/02/17 58.6 kg (129 lb 1.6 oz)              We Performed the Following     LARYNGOSCOPY FLEX FIBEROPTIC, DIAGNOSTIC     OFFICE CONSULTATION,LEVEL III          Today's Medication Changes          These changes are accurate as of: 8/18/17  3:47 PM.  If you have any questions, ask your nurse or doctor.               Start taking these medicines.        Dose/Directions    amoxicillin-clavulanate 875-125 MG per tablet   Commonly known as:  AUGMENTIN   Used for:  Chronic maxillary sinusitis, Chronic rhinitis, unspecified type, Dysphagia, unspecified type   Started by:  Boo Phelps MD        Dose:  1 tablet   Take 1 tablet by mouth 2 times daily for 10 days   Quantity:  20 tablet   Refills:  0            Where to get your medicines      These medications were sent to [place], [place] - [address] [facility] [address]  [address] [place] [address] [address] [address]    Hours:  TRANSPLANT PHONE NUMBER [phone] Phone:  [phone]     amoxicillin-clavulanate 875-125 MG per  tablet                Primary Care Provider Office Phone # Fax #    Nely Lloyd, APRN [phone]       [address] [place] [address] [address]  [facility] [address]        Equal Access to Services     AMI JACK : Hadii aad ku hadsadieo Soshawnaali, waaxda luqadaha, qaybta kaalmada adeegyada, coco yin [facility]. So [facility] [phone].    ATENCIÓN: Si habla español, tiene a stacy disposición servicios gratuitos de asistencia lingüística. Llame al [phone].    We comply with applicable federal civil rights laws and [place] laws. We do not discriminate on the basis of race, color, national origin, age, disability sex, sexual orientation or gender identity.            Thank you!     Thank you for choosing [facility] E[facility]SE [facility]  for your care. Our goal is always to provide you with excellent care. Hearing back from our patients is one way we can continue to improve our services. Please take a few minutes to complete the written survey that you may receive in the mail after your visit with us. Thank you!             Your Updated Medication List - Protect others around you: Learn how to safely use, store and throw away your medicines at www.disposemymeds.org.          This list is accurate as of: 8/18/17  3:47 PM.  Always use your most recent med list.                   Brand Name Dispense Instructions for use Diagnosis    amoxicillin-clavulanate 875-125 MG per tablet    AUGMENTIN    20 tablet    Take 1 tablet by mouth 2 times daily for 10 days    Chronic maxillary sinusitis, Chronic rhinitis, unspecified type, Dysphagia, unspecified type       CALCIUM 1200 PO           levonorgestrel-ethinyl estradiol per tablet    ENPRESSE    84 tablet    Take 1 tablet by mouth daily    Encounter for initial prescription of contraceptive pills       VITAMIN D (CHOLECALCIFEROL) PO      Take 2,000 Units by mouth daily

## 2017-08-18 NOTE — PATIENT INSTRUCTIONS
The patient presents with a history of chronic rhinitis and thick post-nasal drainage that is infected at times. The patient is also having the feeling of food sticking the throat at times.  We will obtain a CT scan of the sinuses in one week after a course of Augmentin.  We will see the patient again in two weeks to assess progress with treatment and to review the results of the CT scan. An allergy evaluation with Dr. Beau Condon will also be ordered.

## 2017-08-29 ENCOUNTER — OFFICE VISIT (OUTPATIENT)
Dept: OBGYN | Facility: CLINIC | Age: 27
End: 2017-08-29
Attending: ADVANCED PRACTICE MIDWIFE
Payer: COMMERCIAL

## 2017-08-29 VITALS
HEIGHT: 68 IN | DIASTOLIC BLOOD PRESSURE: 71 MMHG | BODY MASS INDEX: 18.9 KG/M2 | SYSTOLIC BLOOD PRESSURE: 111 MMHG | HEART RATE: 79 BPM | WEIGHT: 124.7 LBS

## 2017-08-29 DIAGNOSIS — Z00.00 VISIT FOR PREVENTIVE HEALTH EXAMINATION: Primary | ICD-10-CM

## 2017-08-29 DIAGNOSIS — Z01.419 ENCOUNTER FOR GYNECOLOGICAL EXAMINATION WITHOUT ABNORMAL FINDING: ICD-10-CM

## 2017-08-29 DIAGNOSIS — Z12.4 SCREENING FOR MALIGNANT NEOPLASM OF CERVIX: ICD-10-CM

## 2017-08-29 DIAGNOSIS — Z30.011 ENCOUNTER FOR INITIAL PRESCRIPTION OF CONTRACEPTIVE PILLS: ICD-10-CM

## 2017-08-29 PROCEDURE — G0145 SCR C/V CYTO,THINLAYER,RESCR: HCPCS | Performed by: ADVANCED PRACTICE MIDWIFE

## 2017-08-29 PROCEDURE — 99212 OFFICE O/P EST SF 10 MIN: CPT | Mod: ZF

## 2017-08-29 ASSESSMENT — ENCOUNTER SYMPTOMS
STIFFNESS: 1
SINUS PAIN: 1
TROUBLE SWALLOWING: 1

## 2017-08-29 ASSESSMENT — ANXIETY QUESTIONNAIRES
6. BECOMING EASILY ANNOYED OR IRRITABLE: NOT AT ALL
3. WORRYING TOO MUCH ABOUT DIFFERENT THINGS: SEVERAL DAYS
5. BEING SO RESTLESS THAT IT IS HARD TO SIT STILL: NOT AT ALL
1. FEELING NERVOUS, ANXIOUS, OR ON EDGE: SEVERAL DAYS
GAD7 TOTAL SCORE: 2
7. FEELING AFRAID AS IF SOMETHING AWFUL MIGHT HAPPEN: NOT AT ALL
2. NOT BEING ABLE TO STOP OR CONTROL WORRYING: NOT AT ALL

## 2017-08-29 ASSESSMENT — PATIENT HEALTH QUESTIONNAIRE - PHQ9
5. POOR APPETITE OR OVEREATING: NOT AT ALL
SUM OF ALL RESPONSES TO PHQ QUESTIONS 1-9: 0

## 2017-08-29 NOTE — NURSING NOTE
Chief Complaint   Patient presents with     Annual Visit       Geetha Kelley, [facility] 8/29/2017

## 2017-08-29 NOTE — PROGRESS NOTES
"    Progress Note    SUBJECTIVE:  Opal Toth is an 27 year old, Nulliparous., who requests an Annual Preventive Exam.     Concerns today include:  Feeling well overall. 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 Previously lived in [place] and [place].     Currently sexually active with one male partner- have been together for 5 years with 1 year that they broke up. Now engaged.   Denies concerns/exposure to STIs, however, agreeable to GC/CT screening. Would like order placed to complete at a future time. States she did not have another partner during the time they broke up but her fiance notes that he had one female partner.    Has been using COCs since 19 years old. Currently on Enpresse- would like a refill. Happy with this form of contraception- does not miss pills or take them late. LMP 8/21/17,  menses q28 days, x 3-4 days. Not interested in another form of contraception at this time but open to receiving education on LARCs.    Also notes occasional discomfort during foreplay with digital or oral stimulation. States it only occurs when her partner is \"in a certain spot\"- describes as \"weird feeling.\" Resolves when he moves to another area.   No lesions, vaginal irritation, change in discharge or odor.    Would like pap today. Last pap 6/11/2014 at [facility] was NIL. No history of abnormal paps. Has received full course of gardasil.    Menstrual History:  Menstrual History 6/28/2016 8/29/2017 8/29/2017   LAST MENSTRUAL PERIOD 6/27/2016 8/28/2017 -   Menarche age - - 15   Period Cycle (Days) - - 28 days   Period Duration (Days) - - 3-4 days   Method of Contraception - - Combined oral contraceptive   Period Pattern - - Regular   Menstrual Flow - - Moderate   Menstrual Control - - Tampon;Thin pad   Dysmenorrhea - - None   Reviewed Today - - Yes   Comments - - with BC OCP       Last  Pap: 6/11/2014: NIL    Mammogram current: n/a    Last Colonoscopy:n/a    HISTORY:    Current Outpatient Prescriptions on " File Prior to Visit:  Calcium Carbonate-Vit D-Min (CALCIUM 1200 PO)    VITAMIN D, CHOLECALCIFEROL, PO Take 2,000 Units by mouth daily   levonorgestrel-ethinyl estradiol (ENPRESSE) per tablet Take 1 tablet by mouth daily     No current facility-administered medications on file prior to visit.   No Known Allergies  Immunization History   Administered Date(s) Administered     DTAP (<7y) 1992, 1992, 1992, 07/19/1993, 02/19/1997     HIB 1990, 1990, 1990, 07/19/1993     HPVQuadrivalent 08/21/2009, 10/21/2009, 02/22/2010     HepA-Ped 2 dose 08/21/2009, 02/22/2010     Influenza Vaccine IM 3yrs+ 4 Valent IIV4 09/09/2015     MMR 07/19/1993, 12/04/2003     Meningococcal (Menomune ) 08/21/2009     OPV, trivalent, live 1992, 1992, 07/19/1993, 02/19/1997     TD (ADULT, 7+) 12/04/2003     Tdap (Adacel,Boostrix) 06/18/2014       Obstetric History     No data available     Past Medical History:   Diagnosis Date     Arthritis      History reviewed. No pertinent surgical history.  Family History   Problem Relation Age of Onset     CANCER Mother      Multiple Myeloma     Other Cancer Mother      Multiple Myeloma     DIABETES Maternal Grandmother      Type 2     Hypertension Paternal Grandfather      Hypertension Father      CEREBROVASCULAR DISEASE Paternal Grandmother      Melanoma No family hx of      Skin Cancer No family hx of      Social History     Social History     Marital status: Single     Spouse name: N/A     Number of children: N/A     Years of education: N/A     Social History Main Topics     Smoking status: Never Smoker     Smokeless tobacco: Never Used     Alcohol use 0.0 oz/week     Drug use: No     Sexual activity: Yes     Partners: Male     Birth control/ protection: Pill     Other Topics Concern     None     Social History Narrative    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.  She is single but in a monogamous relationship.        ROS   ROS: 10 point ROS neg  "other than the symptoms noted above in the HPI.   PHQ-9 SCORE 8/29/2017   Total Score 0     [place]-7 SCORE 8/18/2017 8/29/2017   Total Score 2 (minimal anxiety) -   Total Score 2 2     Answers for HPI/ROS submitted by the patient on 8/29/2017   PHQ-2 Score: 0    EXAM:  Blood pressure 111/71, pulse 79, height 1.715 m (5' 7.5\"), weight 56.6 kg (124 lb 11.2 oz), last menstrual period 08/28/2017, not currently breastfeeding. Body mass index is 19.24 kg/(m^2).  General - pleasant female in no acute distress.  Skin - no suspicious lesions or rashes  EENT-  PERRLA, euthyroid with out palpable nodules  Neck - supple without lymphadenopathy.  Lungs - clear to auscultation bilaterally.  Heart - regular rate and rhythm without murmur.  Abdomen - soft, nontender, nondistended, no masses or organomegaly noted.  Musculoskeletal - no gross deformities.  Neurological - normal strength, sensation, and mental status.    Breast Exam:  Breast: Without visible skin changes. No dimpling or lesions seen.   Breasts supple, non-tender with palpation, no dominant mass, nodularity, or nipple discharge noted bilaterally. Axillary nodes negative.      Pelvic Exam:  EG/BUS: Normal genital architecture without lesions, erythema or abnormal secretions Bartholin's, Urethra, [place]'s normal   Urethral meatus: normal without lesion  Urethra: no masses, tenderness, or scarring   Recreated discomfort that patient described during foreplay by palpated urethra with large q-tip. WNL, discussed modifying foreplay.  Bladder: no masses or tenderness   Vagina: moist, pink, rugae with odorless and physiologic discharge  secretions  Cervix: Nulliparous, and pink, moist, closed, without lesion or CMT   Very small external cervical os- \"pin\" sized  Uterus: anteverted,  and small, smooth, firm, mobile w/o pain  Adnexa: Within normal limits and No masses, nodularity, tenderness  Rectum:anus normal       ASSESSMENT:  Encounter Diagnoses   Name Primary?     Visit for " "preventive health examination Yes     Encounter for initial prescription of contraceptive pills      Screening for malignant neoplasm of cervix      Encounter for gynecological examination without abnormal finding       PLAN:   Orders Placed This Encounter   Procedures     Pelvic and Breast Exam Procedure []     Pap Smear Exam [] Do Not Remove     Pap imaged thin layer screen reflex to HPV if ASCUS - recommended age 25 - 29 years     Orders Placed This Encounter   Medications     levonorgestrel-ethinyl estradiol (ENPRESSE) per tablet     Sig: Take 1 tablet by mouth daily     Dispense:  84 tablet     Refill:  4       Additional teaching done at this visit regarding calcium (1200 mg per day), self breast exam, exercise, birth control, mental health and weight/diet.    Pap obtained. If WNL, next pap due 8/2020.  GC/CT order placed as \"future\" as pt does not desire to complete today.  Refilled Enpresse BLANK prescription. Happy with this form of contraception at this time, however, open to receiving education on LARCs.  Recreated discomfort that patient described during foreplay by palpating urethra with large q-tip. Exam WNL. Discussed modifying foreplay. Pt verbalized understanding.    Return to clinic in one year.  Follow-up as needed.    ROCK Rojas, EVAN            "

## 2017-08-29 NOTE — PATIENT INSTRUCTIONS

## 2017-08-29 NOTE — LETTER
"8/29/2017     RE: Opal Toth  [address]  [place] [address]     Dear Colleague,    Thank you for referring your patient, Opal Toth, to the [facility] at [facility]. Please see a copy of my visit note below.  Progress Note    SUBJECTIVE:  Opal Toth is an 27 year old, Nulliparous., who requests an Annual Preventive Exam.     Concerns today include:  Feeling well overall. 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. Previously lived in [place] and [place].     Currently sexually active with one male partner- have been together for 5 years with 1 year that they broke up. Now engaged.   Denies concerns/exposure to STIs, however, agreeable to GC/CT screening. Would like order placed to complete at a future time. States she did not have another partner during the time they broke up but her fiance notes that he had one female partner.    Has been using COCs since 19 years old. Currently on Enpresse- would like a refill. Happy with this form of contraception- does not miss pills or take them late. LMP 8/21/17,  menses q28 days, x 3-4 days. Not interested in another form of contraception at this time but open to receiving education on LARCs.    Also notes occasional discomfort during foreplay with digital or oral stimulation. States it only occurs when her partner is \"in a certain spot\"- describes as \"weird feeling.\" Resolves when he moves to another area.   No lesions, vaginal irritation, change in discharge or odor.    Would like pap today. Last pap 6/11/2014 at [facility] was NIL. No history of abnormal paps. Has received full course of gardasil.    Menstrual History:  Menstrual History 6/28/2016 8/29/2017 8/29/2017   LAST MENSTRUAL PERIOD 6/27/2016 8/28/2017 -   Menarche age - - 15   Period Cycle (Days) - - 28 days   Period Duration (Days) - - 3-4 days   Method of Contraception - - Combined oral contraceptive   Period Pattern - - Regular "   Menstrual Flow - - Moderate   Menstrual Control - - Tampon;Thin pad   Dysmenorrhea - - None   Reviewed Today - - Yes   Comments - - with BC OCP       Last  Pap: 6/11/2014: NIL    Mammogram current: n/a    Last Colonoscopy:n/a    HISTORY:    Current Outpatient Prescriptions on File Prior to Visit:  Calcium Carbonate-Vit D-Min (CALCIUM 1200 PO)    VITAMIN D, CHOLECALCIFEROL, PO Take 2,000 Units by mouth daily   levonorgestrel-ethinyl estradiol (ENPRESSE) per tablet Take 1 tablet by mouth daily     No current facility-administered medications on file prior to visit.   No Known Allergies  Immunization History   Administered Date(s) Administered     DTAP (<7y) 1990, 1990, 1992, 07/19/1993, 02/19/1997     HIB 1990, 1990, 1992, 07/19/1993     HPVQuadrivalent 08/21/2009, 10/21/2009, 02/22/2010     HepA-Ped 2 dose 08/21/2009, 02/22/2010     Influenza Vaccine IM 3yrs+ 4 Valent IIV4 09/09/2015     MMR 07/19/1993, 12/04/2003     Meningococcal (Menomune ) 08/21/2009     OPV, trivalent, live 1992, 1992, 07/19/1993, 02/19/1997     TD (ADULT, 7+) 12/04/2003     Tdap (Adacel,Boostrix) 06/18/2014       Obstetric History     No data available     Past Medical History:   Diagnosis Date     Arthritis      History reviewed. No pertinent surgical history.  Family History   Problem Relation Age of Onset     CANCER Mother      Multiple Myeloma     Other Cancer Mother      Multiple Myeloma     DIABETES Maternal Grandmother      Type 2     Hypertension Paternal Grandfather      Hypertension Father      CEREBROVASCULAR DISEASE Paternal Grandmother      Melanoma No family hx of      Skin Cancer No family hx of      Social History     Social History     Marital status: Single     Spouse name: N/A     Number of children: N/A     Years of education: N/A     Social History Main Topics     Smoking status: Never Smoker     Smokeless tobacco: Never Used     Alcohol use 0.0 oz/week     Drug use: No      "Sexual activity: Yes     Partners: Male     Birth control/ protection: Pill     Other Topics Concern     None     Social History Narrative    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  She is single but in a monogamous relationship.        ROS   ROS: 10 point ROS neg other than the symptoms noted above in the HPI.   PHQ-9 SCORE 8/29/2017   Total Score 0     WOODROW-7 SCORE 8/18/2017 8/29/2017   Total Score 2 (minimal anxiety) -   Total Score 2 2     Answers for HPI/ROS submitted by the patient on 8/29/2017   PHQ-2 Score: 0    EXAM:  Blood pressure 111/71, pulse 79, height 1.715 m (5' 7.5\"), weight 56.6 kg (124 lb 11.2 oz), last menstrual period 08/28/2017, not currently breastfeeding. Body mass index is 19.24 kg/(m^2).  General - pleasant female in no acute distress.  Skin - no suspicious lesions or rashes  EENT-  PERRLA, euthyroid with out palpable nodules  Neck - supple without lymphadenopathy.  Lungs - clear to auscultation bilaterally.  Heart - regular rate and rhythm without murmur.  Abdomen - soft, nontender, nondistended, no masses or organomegaly noted.  Musculoskeletal - no gross deformities.  Neurological - normal strength, sensation, and mental status.    Breast Exam:  Breast: Without visible skin changes. No dimpling or lesions seen.   Breasts supple, non-tender with palpation, no dominant mass, nodularity, or nipple discharge noted bilaterally. Axillary nodes negative.      Pelvic Exam:  EG/BUS: Normal genital architecture without lesions, erythema or abnormal secretions Bartholin's, Urethra, [place]'s normal   Urethral meatus: normal without lesion  Urethra: no masses, tenderness, or scarring   Recreated discomfort that patient described during foreplay by palpated urethra with large q-tip. WNL, discussed modifying foreplay.  Bladder: no masses or tenderness   Vagina: moist, pink, rugae with odorless and physiologic discharge  secretions  Cervix: Nulliparous, and pink, moist, closed, without lesion " "or CMT   Very small external cervical os- \"pin\" sized  Uterus: anteverted,  and small, smooth, firm, mobile w/o pain  Adnexa: Within normal limits and No masses, nodularity, tenderness  Rectum:anus normal       ASSESSMENT:  Encounter Diagnoses   Name Primary?     Visit for preventive health examination Yes     Encounter for initial prescription of contraceptive pills      Screening for malignant neoplasm of cervix      Encounter for gynecological examination without abnormal finding       PLAN:   Orders Placed This Encounter   Procedures     Pelvic and Breast Exam Procedure []     Pap Smear Exam [] Do Not Remove     Pap imaged thin layer screen reflex to HPV if ASCUS - recommended age 25 - 29 years     Orders Placed This Encounter   Medications     levonorgestrel-ethinyl estradiol (ENPRESSE) per tablet     Sig: Take 1 tablet by mouth daily     Dispense:  84 tablet     Refill:  4       Additional teaching done at this visit regarding calcium (1200 mg per day), self breast exam, exercise, birth control, mental health and weight/diet.    Pap obtained. If WNL, next pap due 8/2020.  GC/CT order placed as \"future\" as pt does not desire to complete today.  Refilled Enpresse BLANK prescription. Happy with this form of contraception at this time, however, open to receiving education on LARCs.  Recreated discomfort that patient described during foreplay by palpating urethra with large q-tip. Exam WNL. Discussed modifying foreplay. Pt verbalized understanding.    Return to clinic in one year.  Follow-up as needed.    ROCK Rojas, EVAN    "

## 2017-08-29 NOTE — MR AVS SNAPSHOT
After Visit Summary   8/29/2017    Opal Toth    MRN: [id number]           Patient Information     Date Of Birth          1990        Visit Information        Provider Department      8/29/2017 3:45 PM Adan Quijano CNM [facility]        Today's Diagnoses     Visit for preventive health examination    -  1    Encounter for initial prescription of contraceptive pills        Screening for malignant neoplasm of cervix        Encounter for gynecological examination without abnormal finding          Care Instructions      PREVENTIVE HEALTH RECOMMENDATIONS:   Most women need a yearly breast and pelvic exam.    A PAP screen, a test done DURING a pelvic exam, is NO longer recommended yearly.    March 2013, screening guidelines recommended by ACOG for PAP screen are:    1) First pap at age 21.    2) Pap every 3 years until age 30.    3) After age 30, pap every 3 years or Pap with HR HPV screen every 5 years until age 65.  4) Women do NOT need a vaginal Pap screen after a hysterectomy (surgical removal of the uterus) when they have not had cancer.    Exceptions:  1) Yearly pap if HIV+ or immunosuppressed secondary to organ transplant  2) DANIE II-III continue routine screening for 20 years.    I encourage you continue looking for opportunities to choose a healthy lifestyle:       * Choose to eat a heart healthy diet. Check out the FOOD PLATE guidelines at: http://www.choosemyplate.gov/ for helpful hints on weight and cholesterol management.  Balance your caloric intake with exercise to maintain a BMI in the 22 to 26 range. For bone health: Eat calcium-rich foods like yogurt, broccoli or take chewable calcium pills (500 to 600 mg) twice a day with food.       * Exercise for at least an average of 30 minutes a day, 5 days of the week. This will help you control your weight, release stress, and help prevent disease.      * Take a Vitamin D3 supplement daily fall  through spring and during summer unless you vcnb54-51' full body sun exposure to skin without sunscreen.      * DO wear sunscreen to prevent skin cancer after the first 15-30 minutes.      * Identify stressors in your life, find ways to release the stress, and, make time for yourself. PLEASE ask for help if mood changes last longer than two weeks.     * Limit alcohol to one drink per day.  No smoking.  Avoid second hand smoke. If you smoke, ask for help to stop.       *  If you are in a sexual relationship, talk with your partner about possible infection risks and take action to protect yourself from exposure to a sexual infection.    Please request an infection screen for STIs (sexually transmitted infections) if you are less than age 26 OR believe that you may be at risk.     Get a flu shot each year. Get a tetanus shot every 10 years. EVERYONE needs a pertussis (Whooping cough) booster.    See your dentist twice a year for an exam and preventive care cleaning.     Consider the following screen tests:    1) cholesterol test every 5 years.     2) yearly mammogram after age 40 unless you have identified risks.    3) colonoscopy every 10 years after age 50 unless you have identified risks.    4) diabetes blood test screening if you are at risk for diabetes.      Additional information that you may also find helpful:  The Internet now gives us access to LOTS of information -- some of it helpful, research documented and also plenty of harmful, anecdotal information that may not pertain to your situtaion. Consider visiting the following websites for accurate health information:    www.vitamindcouncil.org/ : Info and ongoing research re Vitamin D    www.fairview.org : Up to date and easily searchable information on multiple topics.    www.medlineplus.gov : medication info, interactive tutorials, watch real surgeries online    www.cdc.gov : public health info, travel advisories, epidemics (H1N1)    www.chaz/std.org:  current research re diagnosis, treatment and prevention of sexually contacted infections.    www.health.state.[place] : [place] dept of heat, public health issues in [place], N1N1    www.familydoctor.org : good info from the Academy of Family Physicians                Follow-ups after your visit        Follow-up notes from your care team     Return in 1 year (on 8/29/2018) for Preventative Health Visit.      Your next 10 appointments already scheduled     Sep 06, 2017  7:20 AM CDT   (Arrive by 7:05 AM)   New Patient Visit with Ulises Sanchez MD   [facility] ([facility])    [address] [place] [address]  [address] [facility] [id number] [phone]            Sep 21, 2017  3:15 PM CDT   (Arrive by 3:00 PM)   Return Visit with Shalini Navarro PA-C   [facility] D[facility]at[facility] ([facility])    [address] [place] [address]  [address] [facility] [id number] [phone]            Nov 13, 2017  2:55 PM CST   (Arrive by 2:40 PM)   New Allergy with Beau Condon MD   [facility] ([facility])    [address] [place] [address]  [address] [facility] [id number] [phone]           Do not take anti-histamines or Zantac for seven day prior to your appointment.              Who to contact     Please call your clinic at [phone] to:    Ask questions about your health    Make or cancel appointments    Discuss your medicines    Learn about your test results    Speak to your doctor   If you have compliments or concerns about an experience at your clinic, or if you wish to file a complaint, please contact [facility] Patient Relations at [phone] or email us at [email or web address]         Additional Information About Your Visit        MyChart Information     [employer] gives you secure access to your electronic health record. If you see a primary care provider,  "you can also send messages to your care team and make appointments. If you have questions, please call your primary care clinic.  If you do not have a primary care provider, please call [phone] and they will assist you.      [employer] is an electronic gateway that provides easy, online access to your medical records. With [employer], you can request a clinic appointment, read your test results, renew a prescription or communicate with your care team.     To access your existing account, please contact your [facility] or call [phone] for assistance.        Care EveryWhere ID     This is your Care EveryWhere ID. This could be used by other organizations to access your [place] medical records  [id number]        Your Vitals Were     Pulse Height Last Period BMI (Body Mass Index)          79 1.715 m (5' 7.5\") 08/28/2017 (Approximate) 19.24 kg/m2         Blood Pressure from Last 3 Encounters:   08/29/17 111/71   07/06/17 107/72   03/02/17 124/82    Weight from Last 3 Encounters:   08/29/17 56.6 kg (124 lb 11.2 oz)   08/18/17 56.7 kg (125 lb)   07/06/17 56.2 kg (124 lb)              We Performed the Following     Pap imaged thin layer screen reflex to HPV if ASCUS - recommended age 25 - 29 years     Pap Smear Exam [] Do Not Remove     Pelvic and Breast Exam Procedure []          Where to get your medicines      These medications were sent to [facility] [address] [facility] [phone]  [address] [facility] [address] [address]    Hours:  TRANSPLANT PHONE NUMBER [phone] Phone:  [phone]     levonorgestrel-ethinyl estradiol per tablet          Primary Care Provider Office Phone # Fax #    ROCK Cazares [phone]       [address] [place] [address]  [facility] [address]        Equal Access to Services     AMI JACK AH: Yaz Perez, aurora martínez, coco de santiago " susan rojasivon lima'aan ah. So [facility] [phone].    ATENCIÓN: Si habla marianna, tiene a stacy disposición servicios gratuitos de asistencia lingüística. Nichelle al [phone].    We comply with applicable federal civil rights laws and [place] laws. We do not discriminate on the basis of race, color, national origin, age, disability sex, sexual orientation or gender identity.            Thank you!     Thank you for choosing [facility]  for your care. Our goal is always to provide you with excellent care. Hearing back from our patients is one way we can continue to improve our services. Please take a few minutes to complete the written survey that you may receive in the mail after your visit with us. Thank you!             Your Updated Medication List - Protect others around you: Learn how to safely use, store and throw away your medicines at www.disposemymeds.org.          This list is accurate as of: 8/29/17 11:59 PM.  Always use your most recent med list.                   Brand Name Dispense Instructions for use Diagnosis    CALCIUM 1200 PO           levonorgestrel-ethinyl estradiol per tablet    ENPRESSE    84 tablet    Take 1 tablet by mouth daily    Encounter for initial prescription of contraceptive pills       VITAMIN D (CHOLECALCIFEROL) PO      Take 2,000 Units by mouth daily

## 2017-08-30 ASSESSMENT — ANXIETY QUESTIONNAIRES: GAD7 TOTAL SCORE: 2

## 2017-08-31 ENCOUNTER — OFFICE VISIT (OUTPATIENT)
Dept: OTOLARYNGOLOGY | Facility: CLINIC | Age: 27
End: 2017-08-31

## 2017-08-31 DIAGNOSIS — J30.9 CHRONIC ALLERGIC RHINITIS, UNSPECIFIED SEASONALITY, UNSPECIFIED TRIGGER: Primary | ICD-10-CM

## 2017-08-31 ASSESSMENT — PAIN SCALES - GENERAL: PAINLEVEL: NO PAIN (0)

## 2017-08-31 NOTE — PATIENT INSTRUCTIONS
The patient presents with a history of chronic rhinitis and thick post-nasal drainage that is infected at times. Her CT scan of the sinuses is normal at this time.  An allergy evaluation with Dr. Beau Condon has been scheduled for November. We will try to move this sooner.

## 2017-08-31 NOTE — MR AVS SNAPSHOT
After Visit Summary   8/31/2017    Opal Toth    MRN: [id number]           Patient Information     Date Of Birth          1990        Visit Information        Provider Department      8/31/2017 2:45 PM Boo Phelps MD [facility]        Today's Diagnoses     Chronic allergic rhinitis, unspecified seasonality, unspecified trigger    -  1      Care Instructions    The patient presents with a history of chronic rhinitis and thick post-nasal drainage that is infected at times. Her CT scan of the sinuses is normal at this time.  An allergy evaluation with Dr. Beau Condon has been scheduled for November. We will try to move this sooner.           Follow-ups after your visit        Your next 10 appointments already scheduled     Sep 06, 2017  7:20 AM CDT   (Arrive by 7:05 AM)   New Patient Visit with Ulises Sanchez MD   [facility] ([facility] [place])    [address] [place] [address]  [address] [facility] [id number] [phone]            Sep 21, 2017  3:15 PM CDT   (Arrive by 3:00 PM)   Return Visit with Shalini Navarro PA-C   [facility] ([facility])    [address] [place] [id number] [phone]            Nov 13, 2017  2:55 PM CST   (Arrive by 2:40 PM)   New Allergy with Beau Condon MD   [facility] ([facility])    [address] [place] [id number] [phone]           Do not take anti-histamines or Zantac for seven day prior to your appointment.              Who to contact     Please call your clinic at [phone] to:    Ask questions about your health    Make or cancel appointments    Discuss your medicines    Learn about your test results    Speak to your doctor   If you have compliments or concerns about an experience at your clinic, or if you wish to  file a complaint, please contact [facility] Patient Relations at [phone] or email us at [email or web address]         Additional Information About Your Visit        [employer] Information     [employer] gives you secure access to your electronic health record. If you see a primary care provider, you can also send messages to your care team and make appointments. If you have questions, please call your primary care clinic.  If you do not have a primary care provider, please call [phone] and they will assist you.      [employer] is an electronic gateway that provides easy, online access to your medical records. With [employer], you can request a clinic appointment, read your test results, renew a prescription or communicate with your care team.     To access your existing account, please contact your [facility] or call [phone] for assistance.        Care EveryWhere ID     This is your Care EveryWhere ID. This could be used by other organizations to access your [place] medical records  [id number]        Your Vitals Were     Last Period                   08/28/2017 (Approximate)            Blood Pressure from Last 3 Encounters:   08/29/17 111/71   07/06/17 107/72   03/02/17 124/82    Weight from Last 3 Encounters:   08/29/17 56.6 kg (124 lb 11.2 oz)   08/18/17 56.7 kg (125 lb)   07/06/17 56.2 kg (124 lb)              We Performed the Following     OFFICE CONSULTATION,LEVEL III        Primary Care Provider Office Phone # Fax #    Nely MCDONALD Gabino, APRN [phone]       [address] [place] [address] [address] [place] [address]        Equal Access to Services     AMI JACK : Hadii aad ku hadasho Soomaali, waaxda luqadaha, qaybta kaalmada adeegyada, coco parish. So [facility] [place] [facility] [phone].    ATENCIÓN: Si habla español, tiene a stacy disposición servicios gratuitos de asistencia lingüística. Llame al [phone].    We  comply with applicable federal civil rights laws and [place] laws. We do not discriminate on the basis of race, color, national origin, age, disability sex, sexual orientation or gender identity.            Thank you!     Thank you for choosing [facility]  for your care. Our goal is always to provide you with excellent care. Hearing back from our patients is one way we can continue to improve our services. Please take a few minutes to complete the written survey that you may receive in the mail after your visit with us. Thank you!             Your Updated Medication List - Protect others around you: Learn how to safely use, store and throw away your medicines at www.disposemymeds.org.          This list is accurate as of: 8/31/17  3:03 PM.  Always use your most recent med list.                   Brand Name Dispense Instructions for use Diagnosis    CALCIUM 1200 PO           levonorgestrel-ethinyl estradiol per tablet    ENPRESSE    84 tablet    Take 1 tablet by mouth daily    Encounter for initial prescription of contraceptive pills       VITAMIN D (CHOLECALCIFEROL) PO      Take 2,000 Units by mouth daily

## 2017-08-31 NOTE — LETTER
8/31/2017     RE: Opal Toth  [address]  [place] [address]     Dear Colleague,    Thank you for referring your patient, Opal Toth, to the [facility]AR [facility]ROAT at [facility]. Please see a copy of my visit note below.    The patient presents with a history of chronic rhinitis and thick post-nasal drainage. The patient reports eustachian tube dysfunction associated with these symptoms and at times the feeling that food is sticking in the throat. The patient reports improvement of her symptoms with a recent course of antibiotics. The patient's CT scan of the sinuses is reviewed with her and this demonstrates no chronic sinusitis and no acute sinusitis. The patient is scheduled to visit with Dr. Beau Condon for an allergy evaluation.       All other systems were reviewed and they are either negative or they are not directly pertinent to this Otolaryngology examination.      Past Medical History:    Past Medical History:   Diagnosis Date     Arthritis        Past Surgical History:    No past surgical history on file.    Medications:      Current Outpatient Prescriptions:      levonorgestrel-ethinyl estradiol (ENPRESSE) per tablet, Take 1 tablet by mouth daily, Disp: 84 tablet, Rfl: 4     Calcium Carbonate-Vit D-Min (CALCIUM 1200 PO), , Disp: , Rfl:      VITAMIN D, CHOLECALCIFEROL, PO, Take 2,000 Units by mouth daily, Disp: , Rfl:     Allergies:    Review of patient's allergies indicates no known allergies.    Physical Examination:    The patient is a well developed, well nourished female in no apparent distress.  She is normocephalic, atraumatic with pupils equally round and reactive to light.    Oral Cavity Examination:  Normal mucosa with no masses or lesions  Nasal Examination:  Engorged nasal turbinates and a slightly deviated septum.  There are no masses or lesions in either nostril and no discharge or infection in either nostril at this  time.  Neurological Examination: Facial nerve function intact and symmetric  Integumentary Examination: No lesions on the skin of the head and neck    Assessment and Plan:    The patient presents with a history of chronic rhinitis and thick post-nasal drainage that is infected at times. Her CT scan of the sinuses is normal at this time.  An allergy evaluation with Dr. Beau Condon has been scheduled for November. We will try to move this sooner.     Again, thank you for allowing me to participate in the care of your patient.      Sincerely,    Boo Phelps MD

## 2017-08-31 NOTE — PROGRESS NOTES
The patient presents with a history of chronic rhinitis and thick post-nasal drainage. The patient reports eustachian tube dysfunction associated with these symptoms and at times the feeling that food is sticking in the throat. The patient reports improvement of her symptoms with a recent course of antibiotics. The patient's CT scan of the sinuses is reviewed with her and this demonstrates no chronic sinusitis and no acute sinusitis. The patient is scheduled to visit with Dr. Beau Condon for an allergy evaluation.       All other systems were reviewed and they are either negative or they are not directly pertinent to this Otolaryngology examination.      Past Medical History:    Past Medical History:   Diagnosis Date     Arthritis        Past Surgical History:    No past surgical history on file.    Medications:      Current Outpatient Prescriptions:      levonorgestrel-ethinyl estradiol (ENPRESSE) per tablet, Take 1 tablet by mouth daily, Disp: 84 tablet, Rfl: 4     Calcium Carbonate-Vit D-Min (CALCIUM 1200 PO), , Disp: , Rfl:      VITAMIN D, CHOLECALCIFEROL, PO, Take 2,000 Units by mouth daily, Disp: , Rfl:     Allergies:    Review of patient's allergies indicates no known allergies.    Physical Examination:    The patient is a well developed, well nourished female in no apparent distress.  She is normocephalic, atraumatic with pupils equally round and reactive to light.    Oral Cavity Examination:  Normal mucosa with no masses or lesions  Nasal Examination:  Engorged nasal turbinates and a slightly deviated septum.  There are no masses or lesions in either nostril and no discharge or infection in either nostril at this time.  Neurological Examination: Facial nerve function intact and symmetric  Integumentary Examination: No lesions on the skin of the head and neck    Assessment and Plan:    The patient presents with a history of chronic rhinitis and thick post-nasal drainage that is infected at times. Her CT  scan of the sinuses is normal at this time.  An allergy evaluation with Dr. Beau Condon has been scheduled for November. We will try to move this sooner.

## 2017-08-31 NOTE — NURSING NOTE
Chief Complaint   Patient presents with     RECHECK     follow up after CT      Nicolas Roman LPN

## 2017-09-01 PROBLEM — Z00.00 VISIT FOR PREVENTIVE HEALTH EXAMINATION: Status: ACTIVE | Noted: 2017-09-01

## 2017-09-01 LAB
COPATH REPORT: NORMAL
PAP: NORMAL

## 2017-09-06 ENCOUNTER — OFFICE VISIT (OUTPATIENT)
Dept: ORTHOPEDICS | Facility: CLINIC | Age: 27
End: 2017-09-06

## 2017-09-06 DIAGNOSIS — M25.562 CHRONIC PAIN OF LEFT KNEE: Primary | ICD-10-CM

## 2017-09-06 DIAGNOSIS — G89.29 CHRONIC PAIN OF LEFT KNEE: Primary | ICD-10-CM

## 2017-09-06 NOTE — NURSING NOTE
Reason For Visit:   Chief Complaint   Patient presents with     Consult     Left knee       ?  No  Occupation: [employer]  Currently working? Yes.  Work status?  Full time.  Date of injury: Unknown   Type of injury: NA.  Smoker: No  Request smoking cessation information: No    Pain Assessment  Patient Currently in Pain: Yes  Primary Pain Location: Knee  Pain Orientation: Left  Pain Descriptors: Aching

## 2017-09-06 NOTE — LETTER
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 She denies having any initial injury or trauma to left knee. This has had an insidious onset over the course of about 2 years. She used to run on a regular basis however now she can only power walk due to left knee pain. It bothers her when she wakes up and she feels that it aches on the medial aspect of her knee. All of her pain and tenderness is medially based. She also denies some numbness and tingling starting at the medial aspect of her knee and radiating down the medial aspect leg. She notes no instability denies any locking catching or popping the knee. No swelling no recurrent effusions. She uses over-the-counter Tylenol as needed for pain and discomfort. She also uses topical central oils to help with her pain.    Past medical history: Otherwise healthy.    Medications #1 vitamin D No. 2 calcium carbonate #3 Enpresse    No known medical allergies.    Surgical history: None.    Family history noncontributory    Social history patient works for the [employer] physicians and is a nonsmoker.    Review of systems: Her 10 system review of systems is otherwise negative besides noted in history of present illness.    Physical exam in general healthy-appearing well-nourished 0.5-year-old female who is alert and oriented ×3 in no acute distress and has an appropriate affect. She is breathing complete loss exam. Examination of the left lower extremity reveals no deformity with normal alignment of the knee. Dorsalis pedis pulses 2+ and regular. Normal sensation in all nerve distributions of the foot. No effusion today 1+ lateral 2+ medial patellar mobilization. Negative patellar grind no tenderness to the patellar tendon. No  tenderness the medial or lateral joint lines. Range of motion is 5/0/145 . The knee is stable to varus stress at 0 and 30  of flexion. Negative Lachman. Negative anterior and posterior drawer. Negative shift. Negative Jennie's. Normal hip range of motion with no evidence of impingement. She does have tenderness to palpation about the abductor tubercle and adductor adductor Apoorva tendon. She has a positive Tinel's along the saphenous nerve.    Assessment and plan: We feel today that Opal has been abductor tendon strain as well as mild saphenous neuritis. We do not think this is intra-articular nature and therefore a steroid injection would likely not help her. We feel a course of physical therapy which might include ultrasound treatments as well as out of pheresis would benefit her significantly area weird focus of therapy on her abductor tendon. She should continue to use over-the-counter anti-inflammatories as needed for pain. She should avoid tight fitting braces on her left knee which may exacerbate her saphenous neuritis. We will see her back and proximal 6-8 weeks for repeat exam.    Patient seen and examined with the resident.     Assesment: Left adductor / medial distal thigh pain, does not appear intra-articular    Plan: will try PT, ok for all indicated modalities    I agree with history, physical and imaging as well as the assessment and plan as detailed by Dr. Galvan.       Ulises Sanchez MD

## 2017-09-06 NOTE — PROGRESS NOTES
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 She denies having any initial injury or trauma to left knee. This has had an insidious onset over the course of about 2 years. She used to run on a regular basis however now she can only power walk due to left knee pain. It bothers her when she wakes up and she feels that it aches on the medial aspect of her knee. All of her pain and tenderness is medially based. She also denies some numbness and tingling starting at the medial aspect of her knee and radiating down the medial aspect leg. She notes no instability denies any locking catching or popping the knee. No swelling no recurrent effusions. She uses over-the-counter Tylenol as needed for pain and discomfort. She also uses topical central oils to help with her pain.    Past medical history: Otherwise healthy.    Medications #1 vitamin D No. 2 calcium carbonate #3 Enpresse    No known medical allergies.    Surgical history: None.    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    Review of systems: Her 10 system review of systems is otherwise negative besides noted in history of present illness.    Physical exam in general healthy-appearing well-nourished 0.5-year-old female who is alert and oriented ×3 in no acute distress and has an appropriate affect. She is breathing complete loss exam. Examination of the left lower extremity reveals no deformity with normal alignment of the knee. Dorsalis pedis pulses 2+ and regular. Normal sensation in all nerve distributions of the foot. No effusion today 1+ lateral 2+ medial patellar mobilization. Negative patellar grind no tenderness to the patellar tendon. No tenderness the medial or lateral joint lines. Range of motion is 5/0/145 . The knee  is stable to varus stress at 0 and 30  of flexion. Negative Lachman. Negative anterior and posterior drawer. Negative shift. Negative Jennie's. Normal hip range of motion with no evidence of impingement. She does have tenderness to palpation about the abductor tubercle and adductor adductor [employer] tendon. She has a positive Tinel's along the saphenous nerve.    Assessment and plan: We feel today that Opal has been abductor tendon strain as well as mild saphenous neuritis. We do not think this is intra-articular nature and therefore a steroid injection would likely not help her. We feel a course of physical therapy which might include ultrasound treatments as well as out of pheresis would benefit her significantly area weird focus of therapy on her abductor tendon. She should continue to use over-the-counter anti-inflammatories as needed for pain. She should avoid tight fitting braces on her left knee which may exacerbate her saphenous neuritis. We will see her back and proximal 6-8 weeks for repeat exam.

## 2017-09-06 NOTE — PROGRESS NOTES
Patient seen and examined with the resident.     Assesment: Left adductor / medial distal thigh pain, does not appear intra-articular    Plan: will try PT, ok for all indicated modalities    I agree with history, physical and imaging as well as the assessment and plan as detailed by Dr. Galvan.

## 2017-09-06 NOTE — MR AVS SNAPSHOT
After Visit Summary   9/6/2017    Opal Toth    MRN: [id number]           Patient Information     Date Of Birth          1990        Visit Information        Provider Department      9/6/2017 7:20 AM Ulises Sanchez MD [facility]        Today's Diagnoses     Chronic pain of left knee    -  1       Follow-ups after your visit        Additional Services     [facility] PT, HAND, AND CHIROPRACTIC REFERRAL       === This order will print in the [facility] Scheduling  Office ===    Physical therapy, hand therapy and chiropractic care are available through:    [facility]  [facility]    Call one easy number to schedule at any of the above locations:  [phone].    Your provider has referred you to Physical Therapy at [facility] or [facility]    Indication/Reason for Referral: left knee pain along distal adductor tendon / vmo  Onset of Illness:  1.5 years  Therapy Orders:  Evaluate and Treat  Special Programs:  None  Special Request:  None    Additional Comments for the therapist or chiropractor:  Ok for all modalities, no restrictions, ok for iontopheresis, ok for ultrasound    Please be aware that coverage of these services is subject to the terms and limitations of your health insurance plan.  Call member services at your health plan with any benefit or coverage questions.      Please bring the following to your appointment:    >>   Your personal calendar for scheduling future appointments  >>   Comfortable clothing                  Follow-up notes from your care team     Return in about 6 weeks (around 10/18/2017) for Routine Visit.      Your next 10 appointments already scheduled     Sep 21, 2017  3:15 PM CDT   (Arrive by 3:00 PM)   Return Visit with SANGEETA Joaquin [facility] ([facility])    [address] [place] [address] [phone]            Nov  13, 2017  2:55 PM CST   (Arrive by 2:40 PM)   New Allergy with Beau Condon MD   [facility] ([facility])    [address] [address] [phone]           Do not take anti-histamines or Zantac for seven day prior to your appointment.              Who to contact     Please call your clinic at [phone] to:    Ask questions about your health    Make or cancel appointments    Discuss your medicines    Learn about your test results    Speak to your doctor   If you have compliments or concerns about an experience at your clinic, or if you wish to file a complaint, please contact [facility] Patient Relations at [phone] or email us at [email or web address]         Additional Information About Your Visit        [employer] Information     Live[employer] gives you secure access to your electronic health record. If you see a primary care provider, you can also send messages to your care team and make appointments. If you have questions, please call your primary care clinic.  If you do not have a primary care provider, please call [phone] and they will assist you.      Passworks is an electronic gateway that provides easy, online access to your medical records. With [employer]works, you can request a clinic appointment, read your test results, renew a prescription or communicate with your care team.     To access your existing account, please contact your [facility] or call [phone] for assistance.        Care EveryWhere ID     This is your Care EveryWhere ID. This could be used by other organizations to access your [facility] medical records  [id number]        Your Vitals Were     Last Period                   08/28/2017 (Approximate)            Blood Pressure from Last 3 Encounters:   08/29/17 111/71   07/06/17 107/72   03/02/17 124/82    Weight from Last 3  Encounters:   08/29/17 124 lb 11.2 oz (56.6 kg)   08/18/17 125 lb (56.7 kg)   07/06/17 124 lb (56.2 kg)              We Performed the Following     [facility] PT, HAND, AND CHIROPRACTIC REFERRAL        Primary Care Provider Office Phone # Fax #    ROCK Cazares [phone]       [address] [place] [address] [address]  [facility] [address]        Equal Access to Services     AMI JACK : Hadii aad ku hadasho Soomaali, waaxda luqadaha, qaybta kaalmada adeegyada, waxay idiin hayaan adeeg kharachandler laneisha . So [facility] [phone].    ATENCIÓN: Si habla español, tiene a stacy disposición servicios gratuitos de asistencia lingüística. [facility] [phone].    We comply with applicable federal civil rights laws and [place] laws. We do not discriminate on the basis of race, color, national origin, age, disability sex, sexual orientation or gender identity.            Thank you!     Thank you for choosing [facility]  for your care. Our goal is always to provide you with excellent care. Hearing back from our patients is one way we can continue to improve our services. Please take a few minutes to complete the written survey that you may receive in the mail after your visit with us. Thank you!             Your Updated Medication List - Protect others around you: Learn how to safely use, store and throw away your medicines at www.disposemymeds.org.          This list is accurate as of: 9/6/17  9:32 AM.  Always use your most recent med list.                   Brand Name Dispense Instructions for use Diagnosis    CALCIUM 1200 PO           levonorgestrel-ethinyl estradiol per tablet    ENPRESSE    84 tablet    Take 1 tablet by mouth daily    Encounter for initial prescription of contraceptive pills       VITAMIN D (CHOLECALCIFEROL) PO      Take 2,000 Units by mouth daily

## 2017-09-21 ENCOUNTER — OFFICE VISIT (OUTPATIENT)
Dept: DERMATOLOGY | Facility: CLINIC | Age: 27
End: 2017-09-21

## 2017-09-21 DIAGNOSIS — D48.5 NEOPLASM OF UNCERTAIN BEHAVIOR OF SKIN: ICD-10-CM

## 2017-09-21 DIAGNOSIS — Z86.018 HISTORY OF DYSPLASTIC NEVUS: ICD-10-CM

## 2017-09-21 DIAGNOSIS — Z12.83 SKIN CANCER SCREENING: Primary | ICD-10-CM

## 2017-09-21 DIAGNOSIS — D22.9 MULTIPLE PIGMENTED NEVI: ICD-10-CM

## 2017-09-21 ASSESSMENT — PAIN SCALES - GENERAL
PAINLEVEL: NO PAIN (0)
PAINLEVEL: NO PAIN (0)

## 2017-09-21 NOTE — MR AVS SNAPSHOT
After Visit Summary   9/21/2017    Opal Toth    MRN: [id number]           Patient Information     Date Of Birth          1990        Visit Information        Provider Department      9/21/2017 3:15 PM Shalini Navarro PA-C [facility] D[facility]at[facility]        Today's Diagnoses     Skin cancer screening    -  1    Neoplasm of uncertain behavior of skin        History of dysplastic nevus        Multiple pigmented nevi          Care Instructions    Wound Care After a Biopsy    What is a skin biopsy?  A skin biopsy allows the doctor to examine a very small piece of tissue under the microscope to determine the diagnosis and the best treatment for the skin condition. A local anesthetic (numbing medicine)  is injected with a very small needle into the skin area to be tested. A small piece of skin is taken from the area. Sometimes a suture (stitch) is used.     What are the risks of a skin biopsy?  I will experience scar, bleeding, swelling, pain, crusting and redness. I may experience incomplete removal or recurrence. Risks of this procedure are excessive bleeding, bruising, infection, nerve damage, numbness, thick (hypertrophic or keloidal) scar and non-diagnostic biopsy.    How should I care for my wound for the first 24 hours?    Keep the wound dry and covered for 24 hours    If it bleeds, hold direct pressure on the area for 15 minutes. If bleeding does not stop then go to the emergency room    Avoid strenuous exercise the first 1-2 days or as your doctor instructs you    How should I care for the wound after 24 hours?    After 24 hours, remove the bandage    You may bathe or shower as normal    If you had a scalp biopsy, you can shampoo as usual and can use shower water to clean the biopsy site daily    Clean the wound twice a day with gentle soap and water    Do not scrub, be gentle    Apply white petroleum/Vaseline after cleaning the wound with a cotton swab or a clean finger, and keep the site  covered with a Bandaid /bandage. Bandages are not necessary with a scalp biopsy    If you are unable to cover the site with a Bandaid /bandage, re-apply ointment 2-3 times a day to keep the site moist. Moisture will help with healing    Avoid strenuous activity for first 1-2 days    Avoid lakes, rivers, pools, and oceans until the stitches are removed or the site is healed    How do I clean my wound?    Wash hands thoroughly with soap or use hand  before all wound care    Clean the wound with gentle soap and water    Apply white petroleum/Vaseline  to wound after it is clean    Replace the Bandaid /bandage to keep the wound covered for the first few days or as instructed by your doctor    If you had a scalp biopsy, warm shower water to the area on a daily basis should suffice    What should I use to clean my wound?     Cotton-tipped applicators (Qtips )    White petroleum jelly (Vaseline ). Use a clean new container and use Q-tips to apply.    Bandaids   as needed    Gentle soap     How should I care for my wound long term?    Do not get your wound dirty    Keep up with wound care for one week or until the area is healed.    A small scab will form and fall off by itself when the area is completely healed. The area will be red and will become pink in color as it heals. Sun protection is very important for how your scar will turn out. Sunscreen with an SPF 30 or greater is recommended once the area is healed.    If you have stitches, stitches need to be removed in  days. You may return to our clinic for this or you may have it done locally at your doctor s office.    You should have some soreness but it should be mild and slowly go away over several days. Talk to your doctor about using tylenol for pain,    When should I call my doctor?  If you have increased:     Pain or swelling    Pus or drainage (clear or slightly yellow drainage is ok)    Temperature over 100F    Spreading redness or warmth around  wound    When will I hear about my results?  The biopsy results can take 2-3 weeks to come back. The clinic will call you with the results, send you a "[employer]" message, or have you schedule a follow-up clinic or phone time to discuss the results. Contact our clinics if you do not hear from us in 3 weeks.     Who should I call with questions?    [facility]: [phone]     [facility]: [phone]    For urgent needs outside of business hours call the [facility] at [phone] and ask for the dermatology resident on call              Follow-ups after your visit        Follow-up notes from your care team     Return in about 6 months (around 3/21/2018).      Your next 10 appointments already scheduled     Nov 13, 2017  2:55 PM CST   (Arrive by 2:40 PM)   New Allergy with Beau Condon MD   [facility] ([facility])    [address] [place] [id number] [phone]           Do not take anti-histamines or Zantac for seven day prior to your appointment.              Who to contact     Please call your clinic at [phone] to:    Ask questions about your health    Make or cancel appointments    Discuss your medicines    Learn about your test results    Speak to your doctor   If you have compliments or concerns about an experience at your clinic, or if you wish to file a complaint, please contact [facility] Patient Relations at [phone] or email us at [email or web address]         Additional Information About Your Visit        [employer] Information     [employer] gives you secure access to your electronic health record. If you see a primary care provider, you can also send messages to your care team and make appointments. If you have questions, please call your primary care clinic.  If you do not have a primary care  provider, please call [phone] and they will assist you.      [employer] is an electronic gateway that provides easy, online access to your medical records. With [employer], you can request a clinic appointment, read your test results, renew a prescription or communicate with your care team.     To access your existing account, please contact your [facility] or call [phone] for assistance.        Care EveryWhere ID     This is your Care EveryWhere ID. This could be used by other organizations to access your [place] medical records  [id number]        Your Vitals Were     Last Period                   08/28/2017 (Approximate)            Blood Pressure from Last 3 Encounters:   08/29/17 111/71   07/06/17 107/72   03/02/17 124/82    Weight from Last 3 Encounters:   08/29/17 56.6 kg (124 lb 11.2 oz)   08/18/17 56.7 kg (125 lb)   07/06/17 56.2 kg (124 lb)              We Performed the Following     BIOPSY SKIN/SUBQ/MUC MEM, EACH ADDTL LESION     BIOPSY SKIN/SUBQ/MUC MEM, SINGLE LESION     Surgical pathology exam        Primary Care Provider Office Phone # Fax #    Nely Lloyd, APRN [phone]       [address] [place] [address] [address] [place] [id number]        Equal Access to Services     AMI JACK : Hadii jamie ku hadasho Soomaali, waaxda luqadaha, qaybta kaalmada adeegyada, waxpriscilla parish. So [facility] [phone].    ATENCIÓN: Si habla español, tiene a stacy disposición servicios gratuitos de asistencia lingüística. Llame al [phone].    We comply with applicable federal civil rights laws and [place] laws. We do not discriminate on the basis of race, color, national origin, age, disability sex, sexual orientation or gender identity.            Thank you!     Thank you for choosing [facility]ERMAT[facility]  for your care. Our goal is always to provide you with excellent care. Hearing back from our patients is one way we can continue to improve  our services. Please take a few minutes to complete the written survey that you may receive in the mail after your visit with us. Thank you!             Your Updated Medication List - Protect others around you: Learn how to safely use, store and throw away your medicines at www.disposemymeds.org.          This list is accurate as of: 9/21/17 11:59 PM.  Always use your most recent med list.                   Brand Name Dispense Instructions for use Diagnosis    CALCIUM 1200 PO           levonorgestrel-ethinyl estradiol per tablet    ENPRESSE    84 tablet    Take 1 tablet by mouth daily    Encounter for initial prescription of contraceptive pills       VITAMIN D (CHOLECALCIFEROL) PO      Take 2,000 Units by mouth daily

## 2017-09-21 NOTE — PATIENT INSTRUCTIONS

## 2017-09-21 NOTE — NURSING NOTE
Dermatology Rooming Note    Opal Toth's goals for this visit include:   Chief Complaint   Patient presents with     Skin Check     Skin check, Opal notes two moles she thinks may need removed.     Jahaira Lord LPN

## 2017-09-21 NOTE — PROGRESS NOTES
[facility] Dermatology Note      Dermatology Problem List:  1. Skin cancer screening 3/20 with multiple benign nevi, solar lentigines.  Hx of tanning bed use and outdoor tanning.  3/20/17 Bx to her Left anterior hip (compound nevus with mild dysplasia)  3/20/17 Left central back medial, (compound nevus with mild dysplasia)  3/20/17 Left central back lateral, (compound nevus with moderate dysplasia)   3/20/17 Right central back  (compound nevus with moderate dysplasia)    2. Hx of dyplastic nevi  3. 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         Encounter Date: Sep 21, 2017    History of Present Illness:  Ms. Opal oTth is a 27 year old female who presents after six months. She was last seen here 3/20/17 and had four biopsies, all revealing mild or moderately dysplastic nevi. She states she believes these healed well except for the left anterior hip is red and bumpy.  She is here to have these spots checked and to recheck the rest of her moles.  She has had a lot of sun exposure over her lifetime and some sunburns.       She is feeling well otherwise, without other skin concerns.She denies any spots that are painful, bleeding or itchy.        Past Medical History:   Patient Active Problem List   Diagnosis     Deviated nasal septum     Tonsillar hypertrophy     Visit for preventive health examination     Past Medical History:   Diagnosis Date     Arthritis      History reviewed. No pertinent surgical history.    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 The patient admits to use of tanning beds, prior to events, last time 3 yrs ago.     Family History:  There is no family history of skin  cancer.    Medications:  Current Outpatient Prescriptions   Medication Sig Dispense Refill     levonorgestrel-ethinyl estradiol (ENPRESSE) per tablet Take 1 tablet by mouth daily 84 tablet 4     Calcium Carbonate-Vit D-Min (CALCIUM 1200 PO)        VITAMIN D, CHOLECALCIFEROL, PO Take 2,000 Units by mouth daily       No Known Allergies      Review of Systems:  -Skin/Heme New Pt: The patient admits to frequent sun exposure. Outdoors a lot. Sunburns in youth.  The patient denies excessive scarring or problems healing except as per HPI. The patient denies excessive bleeding.  -Constitutional: The patient denies fatigue, fevers, chills, unintended weight loss, and night sweats.  -HEENT: Patient denies nonhealing oral sores.  -Skin: As above in HPI. No additional skin concerns.    Physical exam:  Vitals: LMP 08/28/2017 (Approximate)  GEN: This is a well developed, well-nourished female in no acute distress, in a pleasant mood.    SKIN: Full skin, which includes the head/face, both arms, chest, back, abdomen,both legs, genitalia and/or groin buttocks, digits and/or nails, was examined.  There are pink atrophic macules on her left central back medial, left central back lateral, right central back.   On her left anterior hip there is a 6 mm hypertrophic papule    On the right lower back  there is a 5 mm brown variegated macule.  On the right lateral abdomen there is a 4 mm brown variegated macule .  On the left labia there is a 3 mm brown macule with irregular borders.   -Multiple regular brown pigmented macules and papules are identified on the face, trunk and extremities, including a 4 cm x 4 cm uniform macule on the right plantar foot .   -Scattered brown macules on sun exposed areas.  -No other lesions of concern on areas examined.     Impression/Plan:  1. Neoplasm of uncertain behavior on the right lower back, right lateral abdomen, left labia The differential diagnosis includes dysplastic nevi vs benign nevi vs other      Shave biopsy:  After discussion of benefits and risks including but not limited to bleeding/bruising, pain/swelling, infection, scar, incomplete removal, nerve damage/numbness, recurrence, and non-diagnostic biopsy, written consent, verbal consent and photographs were obtained. Time-out was performed. The area was cleaned with isopropyl alcohol. 3 mL of 1% lidocaine with epinephrine was injected to obtain adequate anesthesia of the lesion on the  left anterior hip, left central back medial and left central back lateral and right central back. A shave biopsy was performed. Hemostasis was achieved with aluminium chloride. Vaseline and a sterile dressing were applied. The patient tolerated the procedure and no complications were noted. The patient was provided with verbal and written post care instructions.          2 Hx of dysplastic nevi, mild and moderate. No evidence of recurrence today. left anterior hip, left central back medial and left central back lateral and right central back.  3. Multiple clinically benign nevi on the face, trunk and extremities    ABCDs of melanoma were discussed and self skin checks were advised.     Recommend recheck in 6 months.    If doing well at f/u, could consider annual exams.     4. Solar lentigines    Sun precaution was advised including the use of sun screens of SPF 30 or higher, sun protective clothing, and avoidance of tanning beds.          Staff Involved:  Staff Only    All risks, benefits and alternatives were discussed with patient.  Patient is in agreement and understands the assessment and plan.  All questions were answered.  Sun Screen Education was given.   Return to Clinic in 6 months or sooner as needed.   Shalini Navarro PA-C

## 2017-09-25 LAB — COPATH REPORT: NORMAL

## 2017-09-26 PROBLEM — Z12.83 SKIN CANCER SCREENING: Status: ACTIVE | Noted: 2017-09-26

## 2017-09-26 PROBLEM — D22.9 MULTIPLE PIGMENTED NEVI: Status: ACTIVE | Noted: 2017-09-26

## 2017-09-26 PROBLEM — Z86.018 HISTORY OF DYSPLASTIC NEVUS: Status: ACTIVE | Noted: 2017-09-26

## 2017-11-09 NOTE — TELEPHONE ENCOUNTER
APPT INFO    Date /Time: 11/13/17, 2:55pm   Reason for Appt: Allergy    Ref Provider/Clinic: [facility]   Are there internal records? If yes, list: [facility]   Patient Contact (Y/N) & Call Details: No, referred    Action:      OUTSIDE RECORDS CHECKLIST     CLINIC NAME COMMENTS REC (x) IMG (x)

## 2017-11-13 ENCOUNTER — PRE VISIT (OUTPATIENT)
Dept: PULMONOLOGY | Facility: CLINIC | Age: 27
End: 2017-11-13

## 2017-11-13 ENCOUNTER — OFFICE VISIT (OUTPATIENT)
Dept: PULMONOLOGY | Facility: CLINIC | Age: 27
End: 2017-11-13
Attending: ALLERGY & IMMUNOLOGY
Payer: COMMERCIAL

## 2017-11-13 VITALS
HEART RATE: 71 BPM | DIASTOLIC BLOOD PRESSURE: 77 MMHG | RESPIRATION RATE: 18 BRPM | WEIGHT: 125 LBS | SYSTOLIC BLOOD PRESSURE: 123 MMHG | BODY MASS INDEX: 18.94 KG/M2 | HEIGHT: 68 IN | OXYGEN SATURATION: 97 %

## 2017-11-13 DIAGNOSIS — J31.0 OTHER CHRONIC RHINITIS: Primary | ICD-10-CM

## 2017-11-13 PROCEDURE — 99212 OFFICE O/P EST SF 10 MIN: CPT | Mod: 25,ZF

## 2017-11-13 PROCEDURE — 95004 PERQ TESTS W/ALRGNC XTRCS: CPT | Performed by: ALLERGY & IMMUNOLOGY

## 2017-11-13 ASSESSMENT — PAIN SCALES - GENERAL: PAINLEVEL: NO PAIN (0)

## 2017-11-13 NOTE — PROGRESS NOTES
Reason for Visit  Opal Toth is a 27 year old female who is referred by Nely Lloyd for mucus in the throat.    Allergy HPI  She has had mucus in the throat for 5 years and no seasonal pattern.  No itchy eyes or nose.  She thinks she tried allergy meds years ago and no benefit.  She had a barium swollow that was normal.  No food impaction.    She does have a dog, she does not smoke, no work exposures.    Family hx: no allergies.    The patient was seen and examined by Beau Lepe MD   Current Outpatient Prescriptions   Medication     levonorgestrel-ethinyl estradiol (ENPRESSE) per tablet     Calcium Carbonate-Vit D-Min (CALCIUM 1200 PO)     VITAMIN D, CHOLECALCIFEROL, PO     No current facility-administered medications for this visit.      No Known Allergies  Social History     Social History     Marital status: Single     Spouse name: N/A     Number of children: N/A     Years of education: N/A     Occupational History     Not on file.     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  She is single but in a monogamous relationship.      Past Medical History:   Diagnosis Date     Arthritis      No past surgical history on file.  Family History   Problem Relation Age of Onset     CANCER Mother      Multiple Myeloma     Other Cancer Mother      Multiple Myeloma     DIABETES Maternal Grandmother      Type 2     Hypertension Paternal Grandfather      Hypertension Father      CEREBROVASCULAR DISEASE Paternal Grandmother      Melanoma No family hx of      Skin Cancer No family hx of          ROS   A complete ROS was otherwise negative except as noted in the HPI and the end of the note.  /77  Pulse 71   "Resp 18  Ht 1.715 m (5' 7.5\")  Wt 56.7 kg (125 lb)  SpO2 97%  BMI 19.29 kg/m2  Exam:   GENERAL APPEARANCE: Well developed, well nourished, alert, and in no apparent distress.  EYES: PERRL, EOMI, conjunctiva clear non-injected  HENT: Nasal mucosa with no edema and no discharge. No nasal polyps.  No facial tenderness.  EARS: Canals clear, TMs normal  MOUTH: Oral mucosa is moist, without any lesions, no tonsillar enlargement, no oropharyngeal exudate.  LYMPHATICS: No significant cervical, or supraclavicular nodes.  RESP: Good air flow throughout.  No crackles. No rhonchi. No wheezes.  CV: Normal S1, S2, regular rhythm, normal rate. No murmur.  No rub. No gallop. No LE edema.   MS: Extremities normal. No clubbing. No cyanosis.  SKIN: No rashes noted  NEURO: Speech normal, normal strength and tone, normal gait and stance  PSYCH: Normal mentation, orientation to person, place, and time.  Results:  43 percutaneous environmental allergen (and 2 controls) extracts placed by MA and read by MD.  All of these were negative.         Assessment and plan: Opal has about a 5 year history of a sense of mucus in her throat.  IgE mediated environmental skin testing was negative.  Some people will notice if they decrease dairy they will have less mucus production.            "

## 2017-11-13 NOTE — PATIENT INSTRUCTIONS
Opal has about a 5 year history of a sense of mucus in her throat.  IgE mediated environmental skin testing was negative.  Some people will notice if they decrease dairy they will have less mucus production.

## 2017-11-13 NOTE — LETTER
11/13/2017       RE: Opal Toth  [address]  [place] [address]     Dear Colleague,    Thank you for referring your patient, Opal Toth, to the [facility] at [facility]. Please see a copy of my visit note below.    Reason for Visit  Opal Toth is a 27 year old female who is referred by Nely Lloyd for mucus in the throat.    Allergy HPI  She has had mucus in the throat for 5 years and no seasonal pattern.  No itchy eyes or nose.  She thinks she tried allergy meds years ago and no benefit.  She had a barium swollow that was normal.  No food impaction.    She does have a dog, she does not smoke, no work exposures.    Family hx: no allergies.    The patient was seen and examined by Beau Lepe MD   Current Outpatient Prescriptions   Medication     levonorgestrel-ethinyl estradiol (ENPRESSE) per tablet     Calcium Carbonate-Vit D-Min (CALCIUM 1200 PO)     VITAMIN D, CHOLECALCIFEROL, PO     No current facility-administered medications for this visit.      No Known Allergies  Social History     Social History     Marital status: Single     Spouse name: N/A     Number of children: N/A     Years of education: N/A     Occupational History     Not on file.     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  She is single but in a monogamous relationship.      Past Medical History:   Diagnosis Date     Arthritis      No past surgical history on file.  Family History   Problem Relation Age of Onset     CANCER Mother      Multiple Myeloma     Other Cancer Mother      Multiple Myeloma     DIABETES Maternal Grandmother      Type 2      "Hypertension Paternal Grandfather      Hypertension Father      CEREBROVASCULAR DISEASE Paternal Grandmother      Melanoma No family hx of      Skin Cancer No family hx of          ROS   A complete ROS was otherwise negative except as noted in the HPI and the end of the note.  /77  Pulse 71  Resp 18  Ht 1.715 m (5' 7.5\")  Wt 56.7 kg (125 lb)  SpO2 97%  BMI 19.29 kg/m2  Exam:   GENERAL APPEARANCE: Well developed, well nourished, alert, and in no apparent distress.  EYES: PERRL, EOMI, conjunctiva clear non-injected  HENT: Nasal mucosa with no edema and no discharge. No nasal polyps.  No facial tenderness.  EARS: Canals clear, TMs normal  MOUTH: Oral mucosa is moist, without any lesions, no tonsillar enlargement, no oropharyngeal exudate.  LYMPHATICS: No significant cervical, or supraclavicular nodes.  RESP: Good air flow throughout.  No crackles. No rhonchi. No wheezes.  CV: Normal S1, S2, regular rhythm, normal rate. No murmur.  No rub. No gallop. No LE edema.   MS: Extremities normal. No clubbing. No cyanosis.  SKIN: No rashes noted  NEURO: Speech normal, normal strength and tone, normal gait and stance  PSYCH: Normal mentation, orientation to person, place, and time.  Results:  43 percutaneous environmental allergen (and 2 controls) extracts placed by MA and read by MD.  All of these were negative.         Assessment and plan: Opal has about a 5 year history of a sense of mucus in her throat.  IgE mediated environmental skin testing was negative.  Some people will notice if they decrease dairy they will have less mucus production.      Again, thank you for allowing me to participate in the care of your patient.      Sincerely,    Beau Lepe MD      "

## 2017-11-13 NOTE — NURSING NOTE
Chief Complaint   Patient presents with     Consult     New consult on Opal and her Allergy issues     Teodoro Keller CMA at 2:40 PM on 11/13/2017

## 2017-11-13 NOTE — MR AVS SNAPSHOT
After Visit Summary   11/13/2017    Opal Navneet    MRN: [id number]           Patient Information     Date Of Birth          1990        Visit Information        Provider Department      11/13/2017 2:55 PM Beau Condon MD [facility]        Today's Diagnoses     Other chronic rhinitis    -  1      Care Instructions    Opal has about a 5 year history of a sense of mucus in her throat.  IgE mediated environmental skin testing was negative.  Some people will notice if they decrease dairy they will have less mucus production.           Follow-ups after your visit        Who to contact     If you have questions or need follow up information about today's clinic visit or your schedule please contact [facility] directly at [phone].  Normal or non-critical lab and imaging results will be communicated to you by FuturaMediahart, letter or phone within 4 business days after the clinic has received the results. If you do not hear from us within 7 days, please contact the clinic through FuturaMediahart or phone. If you have a critical or abnormal lab result, we will notify you by phone as soon as possible.  Submit refill requests through Technis[employer] or call your pharmacy and they will forward the refill request to us. Please allow 3 business days for your refill to be completed.          Additional Information About Your Visit        MyChart Information     [employer] gives you secure access to your electronic health record. If you see a primary care provider, you can also send messages to your care team and make appointments. If you have questions, please call your primary care clinic.  If you do not have a primary care provider, please call [phone] and they will assist you.        Care EveryWhere ID     This is your Care EveryWhere ID. This could be used by other organizations to access your [facility] medical records  [id number]        Your  "Vitals Were     Pulse Respirations Height Pulse Oximetry BMI (Body Mass Index)       71 18 1.715 m (5' 7.5\") 97% 19.29 kg/m2        Blood Pressure from Last 3 Encounters:   11/13/17 123/77   08/29/17 111/71   07/06/17 107/72    Weight from Last 3 Encounters:   11/13/17 56.7 kg (125 lb)   08/29/17 56.6 kg (124 lb 11.2 oz)   08/18/17 56.7 kg (125 lb)              We Performed the Following     Percutaneous test with allergic extract with number of test to be specified        Primary Care Provider Office Phone # Fax #    Nely Lloyd, APRN -[phone]       [address] [place] [address] [address] [place] [address]        Equal Access to Services     AMI JACK : Hadii aad ku hadasho Socady, waaxda luqadaha, qaybta kaalmada adeegyada, coco peter . So [facility] [place] [phone].    ATENCIÓN: Si habla español, tiene a stacy disposición servicios gratuitos de asistencia lingüística. [facility] [phone].    We comply with applicable federal civil rights laws and [place] laws. We do not discriminate on the basis of race, color, national origin, age, disability, sex, sexual orientation, or gender identity.            Thank you!     Thank you for choosing [facility]  for your care. Our goal is always to provide you with excellent care. Hearing back from our patients is one way we can continue to improve our services. Please take a few minutes to complete the written survey that you may receive in the mail after your visit with us. Thank you!             Your Updated Medication List - Protect others around you: Learn how to safely use, store and throw away your medicines at www.disposemymeds.org.          This list is accurate as of: 11/13/17 11:59 PM.  Always use your most recent med list.                   Brand Name Dispense Instructions for use Diagnosis    CALCIUM 1200 PO           levonorgestrel-ethinyl estradiol per tablet    ENPRESSE    84 tablet    " Take 1 tablet by mouth daily    Encounter for initial prescription of contraceptive pills       VITAMIN D (CHOLECALCIFEROL) PO      Take 2,000 Units by mouth daily

## 2018-01-07 ENCOUNTER — HEALTH MAINTENANCE LETTER (OUTPATIENT)
Age: 28
End: 2018-01-07

## 2018-09-04 DIAGNOSIS — Z30.011 ENCOUNTER FOR INITIAL PRESCRIPTION OF CONTRACEPTIVE PILLS: ICD-10-CM

## 2018-09-04 RX ORDER — LEVONORGESTREL AND ETHINYL ESTRADIOL 6-5-10
KIT ORAL
Qty: 84 TABLET | Refills: 4 | Status: SHIPPED | OUTPATIENT
Start: 2018-09-04

## 2018-09-06 ENCOUNTER — OFFICE VISIT (OUTPATIENT)
Dept: OBGYN | Facility: CLINIC | Age: 28
End: 2018-09-06
Attending: ADVANCED PRACTICE MIDWIFE
Payer: COMMERCIAL

## 2018-09-06 VITALS
HEART RATE: 66 BPM | SYSTOLIC BLOOD PRESSURE: 110 MMHG | WEIGHT: 125.1 LBS | BODY MASS INDEX: 18.96 KG/M2 | HEIGHT: 68 IN | DIASTOLIC BLOOD PRESSURE: 74 MMHG

## 2018-09-06 DIAGNOSIS — Z30.011 ENCOUNTER FOR INITIAL PRESCRIPTION OF CONTRACEPTIVE PILLS: ICD-10-CM

## 2018-09-06 DIAGNOSIS — Z00.00 VISIT FOR PREVENTIVE HEALTH EXAMINATION: Primary | ICD-10-CM

## 2018-09-06 PROCEDURE — G0463 HOSPITAL OUTPT CLINIC VISIT: HCPCS | Mod: ZF

## 2018-09-06 ASSESSMENT — PAIN SCALES - GENERAL: PAINLEVEL: NO PAIN (0)

## 2018-09-06 NOTE — LETTER
2018     RE: Opal Toth  [address] [address] [facility] [address]     Dear Colleague,    Thank you for referring your patient, Opal Toth, to the [facility]. Please see a copy of my visit note below.    Progress Note    SUBJECTIVE:  Opal Toth is an 28 year old, , who requests an Annual Preventive Exam.     Concerns today include: Feeling well overall.     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   Sexual hx: currently sexually active with one male partner, using BLANK's (Enpresse-28) and is happy with this method of contraception. LMP 18, menses q28 days x 3-4 days. She has occasional discomfort during foreplay with digital or oral stimulation, this was addressed at previous visit, she reports partner has learned to avoid this area, she denies vaginal or urinary symptoms, denies concerns/exposure to STIs and GC/CT screening declined    Exercise: 3-4 days a week lifting or walking     Last pap 2017 NIL.     Menstrual History:  Menstrual History 2017   LAST MENSTRUAL PERIOD - 2018 -   Menarche age 15 - 15   Period Cycle (Days) 28 days - 28   Period Duration (Days) 3-4 days - 3 heavy   Method of Contraception Combined oral contraceptive - Combined oral contraceptive   Period Pattern Regular - Regular   Menstrual Flow Moderate - Heavy;Light   Menstrual Control Tampon;Thin pad - Tampon;Thin pad;Panty liner;Maxi pad   Dysmenorrhea None - Mild   PMS Symptoms - - Cramping   Reviewed Today Yes - Yes   Comments with BC OCP - -       Last    Lab Results   Component Value Date    PAP NIL 2017     History of abnormal Pap smear: NO - age 21-29 PAP every 3 years recommended    Mammogram current: not applicable      HISTORY:    Current Outpatient Prescriptions on File Prior to  Visit:  ENPRESSE-28 per tablet TAKE ONE TABLET BY MOUTH EVERY DAY   Calcium Carbonate-Vit D-Min (CALCIUM 1200 PO)    VITAMIN D, CHOLECALCIFEROL, PO Take 2,000 Units by mouth daily     No current facility-administered medications on file prior to visit.   No Known Allergies  Immunization History   Administered Date(s) Administered     DTAP (<7y) 1992, 1992, 1992, 1993, 1997     HEPA 2009, 2010     HPV 2009, 10/21/2009, 2010     Hib (PRP-T) 1992, 1992, 1992, 1993     Influenza Vaccine IM 3yrs+ 4 Valent IIV4 2015     MMR 1993, 2003     Meningococcal (Menomune ) 2009     OPV, trivalent, live 1992, 1992, 1993, 1997     TD (ADULT, 7+) 2003     Tdap (Adacel,Boostrix) 2014       Obstetric History       T0      L0     SAB0   TAB0   Ectopic0   Multiple0   Live Births0      Past Medical History:   Diagnosis Date     Arthritis      Past Surgical History:   Procedure Laterality Date     NO HISTORY OF SURGERY       Family History   Problem Relation Age of Onset     Cancer Mother      Multiple Myeloma     Other Cancer Mother      Multiple Myeloma     Diabetes Maternal Grandmother      Type 2     Hypertension Paternal Grandfather      Hypertension Father      Cerebrovascular Disease Paternal Grandmother      Melanoma No family hx of      Skin Cancer No family hx of      Social History     Social History     Marital status: Single     Spouse name: N/A     Number of children: N/A     Years of education: N/A     Social History Main Topics     Smoking status: Never Smoker     Smokeless tobacco: Never Used     Alcohol use 0.0 oz/week      Comment: Rare     Drug use: No     Sexual activity: Yes     Partners: Male     Birth control/ protection: Pill     Other Topics Concern     None     Social History Narrative    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  She is single but in a  "monogamous relationship.         How much exercise per week? 3-4 x's    How much calcium per day? Some foods       How much caffeine per day? 1 cup     How much vitamin D per day? Foods and supplement    Do you/your family wear seatbelts?  Yes    Do you/your family use safety helmets? n/a    Do you/your family use sunscreen? Yes    Do you/your family keep firearms in the home? Yes    Do you/your family have a smoke detector(s)? Yes        September 6, 2018      Florina Horner RN               ROS   ROS: 10 point ROS neg other than the symptoms noted above in the HPI.    PHQ9= 0, GAD7= 2    EXAM:  Blood pressure 110/74, pulse 66, height 1.715 m (5' 7.5\"), weight 56.7 kg (125 lb 1.6 oz), last menstrual period 08/27/2018, not currently breastfeeding. Body mass index is 19.3 kg/(m^2).  General - pleasant female in no acute distress.  Skin - no suspicious lesions or rashes  EENT-  PERRLA, euthyroid with out palpable nodules  Neck - supple without lymphadenopathy.  Lungs - clear to auscultation bilaterally.  Heart - regular rate and rhythm without murmur.  Abdomen - soft, nontender, nondistended, no masses or organomegaly noted.  Musculoskeletal - no gross deformities.  Neurological - normal strength, sensation, and mental status.    Breast Exam:  Breast: Without visible skin changes. No dimpling or lesions seen.   Breasts supple, non-tender with palpation, no dominant mass, nodularity, or nipple discharge noted bilaterally. Axillary nodes negative.    Pelvic exam: Pt declined. Not indicated.    ASSESSMENT:  Encounter Diagnoses   Name Primary?     Visit for preventive health examination Yes     Encounter for initial prescription of contraceptive pills         PLAN:     Additional teaching done at this visit regarding calcium (1200 mg per day), self breast exam, exercise and birth control.    Reviewed contraception options, including LARCs. Pt desires to continue on current oral contraceptives.  - Prescription for " "Enpresse-28 sent on 9/4/18.    - Declines GC/CT testing.     Return to clinic in one year.  Follow-up as needed.    I, Linnette TAVERASN, MIGUEL A Student , am serving as a scribe to document services personally performed by CNM based on the provider's statements to me.\" - Linnette RODRIGUEZ, WHMIGUEL A Student     The encounter was performed by me and scribed by the WHNP student. The scribed note accurately reflects my personal services and decisions made by me.     ROCK Rojas, EVAN     "

## 2018-09-06 NOTE — MR AVS SNAPSHOT
After Visit Summary   9/6/2018    Opal Toth    MRN: [id number]           Patient Information     Date Of Birth          1990        Visit Information        Provider Department      9/6/2018 3:30 PM Adan Quijano CNM [facility]        Today's Diagnoses     Visit for preventive health examination    -  1    Encounter for initial prescription of contraceptive pills          Care Instructions      PREVENTIVE HEALTH RECOMMENDATIONS:   Most women need a yearly breast and pelvic exam.    A PAP screen, a test done DURING a pelvic exam, is NO longer recommended yearly.    March 2013, screening guidelines recommended by ACOG for PAP screen are:    1) First pap at age 21.    2) Pap every 3 years until age 30.    3) After age 30, pap every 3 years or Pap with HR HPV screen every 5 years until age 65.  4) Women do NOT need a vaginal Pap screen after a hysterectomy (surgical removal of the uterus) when they have not had cancer.    Exceptions:  1) Yearly pap if HIV+ or immunosuppressed secondary to organ transplant  2) DANIE II-III continue routine screening for 20 years.    I encourage you continue looking for opportunities to choose a healthy lifestyle:       * Choose to eat a heart healthy diet. Check out the FOOD PLATE guidelines at: http://www.choosemyplate.gov/ for helpful hints on weight and cholesterol management.  Balance your caloric intake with exercise to maintain a BMI in the 22 to 26 range. For bone health: Eat calcium-rich foods like yogurt, broccoli or take chewable calcium pills (500 to 600 mg) twice a day with food.       * Exercise for at least an average of 30 minutes a day, 5 days of the week. This will help you control your weight, release stress, and help prevent disease.      * Take a Vitamin D3 supplement daily fall through spring and during summer unless you [id number]' full body sun exposure to skin without sunscreen.      * DO wear sunscreen  to prevent skin cancer after the first 15-30 minutes.      * Identify stressors in your life, find ways to release the stress, and, make time for yourself. PLEASE ask for help if mood changes last longer than two weeks.     * Limit alcohol to one drink per day.  No smoking.  Avoid second hand smoke. If you smoke, ask for help to stop.       *  If you are in a sexual relationship, talk with your partner about possible infection risks and take action to protect yourself from exposure to a sexual infection.    Please request an infection screen for STIs (sexually transmitted infections) if you are less than age 26 OR believe that you may be at risk.     Get a flu shot each year. Get a tetanus shot every 10 years. EVERYONE needs a pertussis (Whooping cough) booster.    See your dentist twice a year for an exam and preventive care cleaning.     Consider the following screen tests:    1) cholesterol test every 5 years.     2) yearly mammogram after age 40 unless you have identified risks.    3) colonoscopy every 10 years after age 50 unless you have identified risks.    4) diabetes blood test screening if you are at risk for diabetes.      Additional information that you may also find helpful:  The Internet now gives us access to LOTS of information -- some of it helpful, research documented and also plenty of harmful, anecdotal information that may not pertain to your situtaion. Consider visiting the following websites for accurate health information:    www.vitamindcouncil.org/ : Info and ongoing research re Vitamin D    www.fairview.org : Up to date and easily searchable information on multiple topics.    www.medlineplus.gov : medication info, interactive tutorials, watch real surgeries online    www.cdc.gov : public health info, travel advisories, epidemics (H1N1)    www.chaz/std.org: current research re diagnosis, treatment and prevention of sexually contacted infections.    www.health.[facility].us : [place] dept of  "heat, public health issues in [place], N1N1    www.familydoctor.org : good info from the Academy of Family Physicians                Follow-ups after your visit        Follow-up notes from your care team     Return in about 1 year (around 9/6/2019) for annual exam.      Who to contact     Please call your clinic at [phone] to:    Ask questions about your health    Make or cancel appointments    Discuss your medicines    Learn about your test results    Speak to your doctor            Additional Information About Your Visit        [employer] Information     [employer] gives you secure access to your electronic health record. If you see a primary care provider, you can also send messages to your care team and make appointments. If you have questions, please call your primary care clinic.  If you do not have a primary care provider, please call [phone] and they will assist you.      [employer]row[employer]cer is an electronic gateway that provides easy, online access to your medical records. With [employer], you can request a clinic appointment, read your test results, renew a prescription or communicate with your care team.     To access your existing account, please contact your [facility] or call [phone] for assistance.        Care EveryWhere ID     This is your Care EveryWhere ID. This could be used by other organizations to access your [facility] medical records  [id number]        Your Vitals Were     Pulse Height Last Period BMI (Body Mass Index)          66 1.715 m (5' 7.5\") 08/27/2018 19.3 kg/m2         Blood Pressure from Last 3 Encounters:   09/06/18 110/74   11/13/17 123/77   08/29/17 111/71    Weight from Last 3 Encounters:   09/06/18 56.7 kg (125 lb 1.6 oz)   11/13/17 56.7 kg (125 lb)   08/29/17 56.6 kg (124 lb 11.2 oz)              Today, you had the following     No orders found for display       Primary Care Provider Office Phone # Fax #    ROCK Cazares [phone] " [phone]       [address] [place] [address] [address]  [facility] [address]        Equal Access to Services     AMI JACK : Hadii aad ku hadsadiemazin Perez, wastephanieda lanrobynha, qajoleneta kadeshawnda mariannemargarettelizbeth, waxay idiin hayjuniorximena araujobrockchandler parish. So [facility] [phone].    ATENCIÓN: Si habla español, tiene a stacy disposición servicios gratuitos de asistencia lingüística. Llame al [phone].    We comply with applicable federal civil rights laws and [place] laws. We do not discriminate on the basis of race, color, national origin, age, disability, sex, sexual orientation, or gender identity.            Thank you!     Thank you for choosing [facility]  for your care. Our goal is always to provide you with excellent care. Hearing back from our patients is one way we can continue to improve our services. Please take a few minutes to complete the written survey that you may receive in the mail after your visit with us. Thank you!             Your Updated Medication List - Protect others around you: Learn how to safely use, store and throw away your medicines at www.disposemymeds.org.          This list is accurate as of 9/6/18 11:59 PM.  Always use your most recent med list.                   Brand Name Dispense Instructions for use Diagnosis    CALCIUM 1200 PO           ENPRESSE-28 per tablet   Generic drug:  levonorgestrel-ethinyl estradiol     84 tablet    TAKE ONE TABLET BY MOUTH EVERY DAY    Encounter for initial prescription of contraceptive pills       VITAMIN D (CHOLECALCIFEROL) PO      Take 2,000 Units by mouth daily

## 2018-09-06 NOTE — PROGRESS NOTES
Progress Note    SUBJECTIVE:  Opal Toth is an 28 year old, , who requests an Annual Preventive Exam.     Concerns today include: Feeling well overall.     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   Sexual hx: currently sexually active with one male partner, using BLANK's (Enpresse-28) and is happy with this method of contraception. LMP 18, menses q28 days x 3-4 days. She has occasional discomfort during foreplay with digital or oral stimulation, this was addressed at previous visit, she reports partner has learned to avoid this area, she denies vaginal or urinary symptoms, denies concerns/exposure to STIs and GC/CT screening declined    Exercise: 3-4 days a week lifting or walking     Last pap 2017 NIL.     Menstrual History:  Menstrual History 2017   LAST MENSTRUAL PERIOD - 2018 -   Menarche age 15 - 15   Period Cycle (Days) 28 days - 28   Period Duration (Days) 3-4 days - 3 heavy   Method of Contraception Combined oral contraceptive - Combined oral contraceptive   Period Pattern Regular - Regular   Menstrual Flow Moderate - Heavy;Light   Menstrual Control Tampon;Thin pad - Tampon;Thin pad;Panty liner;Maxi pad   Dysmenorrhea None - Mild   PMS Symptoms - - Cramping   Reviewed Today Yes - Yes   Comments with BC OCP - -       Last    Lab Results   Component Value Date    PAP NIL 2017     History of abnormal Pap smear: NO - age 21-29 PAP every 3 years recommended    Mammogram current: not applicable      HISTORY:    Current Outpatient Prescriptions on File Prior to Visit:  ENPRESSE-28 per tablet TAKE ONE TABLET BY MOUTH EVERY DAY   Calcium Carbonate-Vit D-Min (CALCIUM 1200 PO)    VITAMIN D, CHOLECALCIFEROL, PO Take 2,000 Units by mouth daily     No current facility-administered medications on file prior to visit.   No Known  Allergies  Immunization History   Administered Date(s) Administered     DTAP (<7y) 1992, 1992, 1992, 1993, 1997     HEPA 2009, 2010     HPV 2009, 10/21/2009, 2010     Hib (PRP-T) 1992, 1992, 1992, 1993     Influenza Vaccine IM 3yrs+ 4 Valent IIV4 2015     MMR 1993, 2003     Meningococcal (Menomune ) 2009     OPV, trivalent, live 1992, 1992, 1993, 1997     TD (ADULT, 7+) 2003     Tdap (Adacel,Boostrix) 2014       Obstetric History       T0      L0     SAB0   TAB0   Ectopic0   Multiple0   Live Births0      Past Medical History:   Diagnosis Date     Arthritis      Past Surgical History:   Procedure Laterality Date     NO HISTORY OF SURGERY       Family History   Problem Relation Age of Onset     Cancer Mother      Multiple Myeloma     Other Cancer Mother      Multiple Myeloma     Diabetes Maternal Grandmother      Type 2     Hypertension Paternal Grandfather      Hypertension Father      Cerebrovascular Disease Paternal Grandmother      Melanoma No family hx of      Skin Cancer No family hx of      Social History     Social History     Marital status: Single     Spouse name: N/A     Number of children: N/A     Years of education: N/A     Social History Main Topics     Smoking status: Never Smoker     Smokeless tobacco: Never Used     Alcohol use 0.0 oz/week      Comment: Rare     Drug use: No     Sexual activity: Yes     Partners: Male     Birth control/ protection: Pill     Other Topics Concern     None     Social History Narrative    She works at [employer] in the surgery center at [employer] [facility] [employer].  She is single but in a monogamous relationship.         How much exercise per week? 3-4 x's    How much calcium per day? Some foods       How much caffeine per day? 1 cup     How much vitamin D per day? Foods and supplement    Do you/your family wear seatbelts?  Yes    Do you/your  "family use safety helmets? n/a    Do you/your family use sunscreen? Yes    Do you/your family keep firearms in the home? Yes    Do you/your family have a smoke detector(s)? Yes        September 6, 2018      Florina Horner RN               ROS   ROS: 10 point ROS neg other than the symptoms noted above in the HPI.    PHQ9= 0, GAD7= 2    EXAM:  Blood pressure 110/74, pulse 66, height 1.715 m (5' 7.5\"), weight 56.7 kg (125 lb 1.6 oz), last menstrual period 08/27/2018, not currently breastfeeding. Body mass index is 19.3 kg/(m^2).  General - pleasant female in no acute distress.  Skin - no suspicious lesions or rashes  EENT-  PERRLA, euthyroid with out palpable nodules  Neck - supple without lymphadenopathy.  Lungs - clear to auscultation bilaterally.  Heart - regular rate and rhythm without murmur.  Abdomen - soft, nontender, nondistended, no masses or organomegaly noted.  Musculoskeletal - no gross deformities.  Neurological - normal strength, sensation, and mental status.    Breast Exam:  Breast: Without visible skin changes. No dimpling or lesions seen.   Breasts supple, non-tender with palpation, no dominant mass, nodularity, or nipple discharge noted bilaterally. Axillary nodes negative.    Pelvic exam: Pt declined. Not indicated.    ASSESSMENT:  Encounter Diagnoses   Name Primary?     Visit for preventive health examination Yes     Encounter for initial prescription of contraceptive pills         PLAN:     Additional teaching done at this visit regarding calcium (1200 mg per day), self breast exam, exercise and birth control.    Reviewed contraception options, including LARCs. Pt desires to continue on current oral contraceptives.  - Prescription for Enpresse-28 sent on 9/4/18.    - Declines GC/CT testing.     Return to clinic in one year.  Follow-up as needed.    Linnette MCGINNIS RN BSN, WHNP Student , am serving as a scribe to document services personally performed by CNTAMARA based on the provider's " "statements to me.\" - Linnette Garcia RN BSN, NP Student     The encounter was performed by me and scribed by the NP student. The scribed note accurately reflects my personal services and decisions made by me.     Adan Quijano, ROCK, CNM   "

## 2018-09-15 NOTE — PATIENT INSTRUCTIONS

## 2020-01-13 NOTE — LETTER
9/21/2017       RE: Opal Toth  [address] [facility] [place] [address]     Dear Colleague,    Thank you for referring your patient, Opal Toth, to the [facility] at [facility]. Please see a copy of my visit note below.    Formerly [facility] Dermatology Note      Dermatology Problem List:  1. Skin cancer screening 3/20 with multiple benign nevi, solar lentigines.  Hx of tanning bed use and outdoor tanning.  3/20/17 Bx to her Left anterior hip (compound nevus with mild dysplasia)  3/20/17 Left central back medial, (compound nevus with mild dysplasia)  3/20/17 Left central back lateral, (compound nevus with moderate dysplasia)   3/20/17 Right central back  (compound nevus with moderate dysplasia)    2. Hx of dyplastic nevi  3. 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         Encounter Date: Sep 21, 2017    History of Present Illness:  Ms. Opal Toth is a 27 year old female who presents after six months. She was last seen here 3/20/17 and had four biopsies, all revealing mild or moderately dysplastic nevi. She states she believes these healed well except for the left anterior hip is red and bumpy.  She is here to have these spots checked and to recheck the rest of her moles.  She has had a lot of sun exposure over her lifetime and some sunburns.       She is feeling well otherwise, without other skin concerns.She denies any spots that are painful, bleeding or itchy.        Past Medical History:   Patient Active Problem List   Diagnosis     Deviated nasal septum     Tonsillar hypertrophy     Visit for preventive health examination     Past Medical History:   Diagnosis Date  Patient informed and conveyed understanding.        Arthritis      History reviewed. No pertinent surgical history.    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 The patient admits to use of tanning beds, prior to events, last time 3 yrs ago.     Family History:  There is no family history of skin cancer.    Medications:  Current Outpatient Prescriptions   Medication Sig Dispense Refill     levonorgestrel-ethinyl estradiol (ENPRESSE) per tablet Take 1 tablet by mouth daily 84 tablet 4     Calcium Carbonate-Vit D-Min (CALCIUM 1200 PO)        VITAMIN D, CHOLECALCIFEROL, PO Take 2,000 Units by mouth daily       No Known Allergies      Review of Systems:  -Skin/Heme New Pt: The patient admits to frequent sun exposure. Outdoors a lot. Sunburns in youth.  The patient denies excessive scarring or problems healing except as per HPI. The patient denies excessive bleeding.  -Constitutional: The patient denies fatigue, fevers, chills, unintended weight loss, and night sweats.  -HEENT: Patient denies nonhealing oral sores.  -Skin: As above in HPI. No additional skin concerns.    Physical exam:  Vitals: LMP 08/28/2017 (Approximate)  GEN: This is a well developed, well-nourished female in no acute distress, in a pleasant mood.    SKIN: Full skin, which includes the head/face, both arms, chest, back, abdomen,both legs, genitalia and/or groin buttocks, digits and/or nails, was examined.  There are pink atrophic macules on her left central back medial, left central back lateral, right central back.   On her left anterior hip there is a 6 mm hypertrophic papule    On the right lower back  there is a 5 mm brown variegated macule.  On the right lateral abdomen there is a 4 mm brown variegated macule .  On the left labia there is a 3 mm brown macule with irregular borders.   -Multiple regular brown pigmented macules and papules are identified on the face, trunk and extremities, including a 4 cm x 4 cm uniform macule on the right plantar foot .   -Scattered brown macules on  sun exposed areas.  -No other lesions of concern on areas examined.     Impression/Plan:  1. Neoplasm of uncertain behavior on the right lower back, right lateral abdomen, left labia The differential diagnosis includes dysplastic nevi vs benign nevi vs other     Shave biopsy:  After discussion of benefits and risks including but not limited to bleeding/bruising, pain/swelling, infection, scar, incomplete removal, nerve damage/numbness, recurrence, and non-diagnostic biopsy, written consent, verbal consent and photographs were obtained. Time-out was performed. The area was cleaned with isopropyl alcohol. 3 mL of 1% lidocaine with epinephrine was injected to obtain adequate anesthesia of the lesion on the  left anterior hip, left central back medial and left central back lateral and right central back. A shave biopsy was performed. Hemostasis was achieved with aluminium chloride. Vaseline and a sterile dressing were applied. The patient tolerated the procedure and no complications were noted. The patient was provided with verbal and written post care instructions.          2 Hx of dysplastic nevi, mild and moderate. No evidence of recurrence today. left anterior hip, left central back medial and left central back lateral and right central back.  3. Multiple clinically benign nevi on the face, trunk and extremities    ABCDs of melanoma were discussed and self skin checks were advised.     Recommend recheck in 6 months.    If doing well at f/u, could consider annual exams.     4. Solar lentigines    Sun precaution was advised including the use of sun screens of SPF 30 or higher, sun protective clothing, and avoidance of tanning beds.          Staff Involved:  Staff Only    All risks, benefits and alternatives were discussed with patient.  Patient is in agreement and understands the assessment and plan.  All questions were answered.  Sun Screen Education was given.   Return to Clinic in 6 months or sooner as needed.    Shalini Navarro PA-C

## 2020-03-10 ENCOUNTER — HEALTH MAINTENANCE LETTER (OUTPATIENT)
Age: 30
End: 2020-03-10

## 2020-12-27 ENCOUNTER — HEALTH MAINTENANCE LETTER (OUTPATIENT)
Age: 30
End: 2020-12-27

## 2021-04-24 ENCOUNTER — HEALTH MAINTENANCE LETTER (OUTPATIENT)
Age: 31
End: 2021-04-24

## 2021-10-09 ENCOUNTER — HEALTH MAINTENANCE LETTER (OUTPATIENT)
Age: 31
End: 2021-10-09

## 2022-05-16 ENCOUNTER — HEALTH MAINTENANCE LETTER (OUTPATIENT)
Age: 32
End: 2022-05-16

## 2022-09-11 ENCOUNTER — HEALTH MAINTENANCE LETTER (OUTPATIENT)
Age: 32
End: 2022-09-11

## 2023-06-03 ENCOUNTER — HEALTH MAINTENANCE LETTER (OUTPATIENT)
Age: 33
End: 2023-06-03